# Patient Record
Sex: FEMALE | Race: WHITE | Employment: PART TIME | URBAN - METROPOLITAN AREA
[De-identification: names, ages, dates, MRNs, and addresses within clinical notes are randomized per-mention and may not be internally consistent; named-entity substitution may affect disease eponyms.]

---

## 2017-01-23 ENCOUNTER — OFFICE VISIT (OUTPATIENT)
Dept: INTERNAL MEDICINE CLINIC | Age: 32
End: 2017-01-23

## 2017-01-23 VITALS
BODY MASS INDEX: 22.26 KG/M2 | HEART RATE: 88 BPM | HEIGHT: 62 IN | DIASTOLIC BLOOD PRESSURE: 60 MMHG | TEMPERATURE: 97.9 F | WEIGHT: 121 LBS | SYSTOLIC BLOOD PRESSURE: 98 MMHG | RESPIRATION RATE: 16 BRPM

## 2017-01-23 DIAGNOSIS — J01.90 ACUTE SINUSITIS, RECURRENCE NOT SPECIFIED, UNSPECIFIED LOCATION: Primary | ICD-10-CM

## 2017-01-23 PROCEDURE — 99213 OFFICE O/P EST LOW 20 MIN: CPT | Performed by: INTERNAL MEDICINE

## 2017-01-23 RX ORDER — AMOXICILLIN 500 MG/1
500 CAPSULE ORAL 3 TIMES DAILY
Qty: 30 CAPSULE | Refills: 0 | Status: SHIPPED | OUTPATIENT
Start: 2017-01-23 | End: 2017-02-02

## 2017-01-23 ASSESSMENT — PATIENT HEALTH QUESTIONNAIRE - PHQ9
SUM OF ALL RESPONSES TO PHQ QUESTIONS 1-9: 0
2. FEELING DOWN, DEPRESSED OR HOPELESS: 0
SUM OF ALL RESPONSES TO PHQ9 QUESTIONS 1 & 2: 0
1. LITTLE INTEREST OR PLEASURE IN DOING THINGS: 0

## 2017-05-05 ENCOUNTER — OFFICE VISIT (OUTPATIENT)
Dept: INTERNAL MEDICINE CLINIC | Age: 32
End: 2017-05-05

## 2017-05-05 ENCOUNTER — TELEPHONE (OUTPATIENT)
Dept: INTERNAL MEDICINE CLINIC | Age: 32
End: 2017-05-05

## 2017-05-05 VITALS
TEMPERATURE: 99.2 F | WEIGHT: 127.4 LBS | HEART RATE: 98 BPM | RESPIRATION RATE: 16 BRPM | BODY MASS INDEX: 23.45 KG/M2 | SYSTOLIC BLOOD PRESSURE: 98 MMHG | DIASTOLIC BLOOD PRESSURE: 60 MMHG | HEIGHT: 62 IN

## 2017-05-05 DIAGNOSIS — J02.9 ACUTE PHARYNGITIS, UNSPECIFIED ETIOLOGY: Primary | ICD-10-CM

## 2017-05-05 PROCEDURE — 99213 OFFICE O/P EST LOW 20 MIN: CPT | Performed by: INTERNAL MEDICINE

## 2017-05-05 RX ORDER — PNV NO.95/FERROUS FUM/FOLIC AC 28MG-0.8MG
TABLET ORAL
COMMUNITY

## 2017-12-28 ENCOUNTER — OFFICE VISIT (OUTPATIENT)
Dept: SURGERY | Age: 32
End: 2017-12-28

## 2017-12-28 VITALS
HEIGHT: 62 IN | BODY MASS INDEX: 23 KG/M2 | SYSTOLIC BLOOD PRESSURE: 120 MMHG | WEIGHT: 125 LBS | DIASTOLIC BLOOD PRESSURE: 80 MMHG

## 2017-12-28 DIAGNOSIS — L72.3 SEBACEOUS CYST: Primary | ICD-10-CM

## 2017-12-28 PROCEDURE — 11421 EXC H-F-NK-SP B9+MARG 0.6-1: CPT | Performed by: SURGERY

## 2017-12-28 NOTE — LETTER
[facility]  [address] [place] [address] [facility] [address]  Phone: [phone]  Fax: [phone]    Mindy Allred MD        December 28, 2017     Angus Cheema 3    Patient: Bc Redding  MR Number: [id number]  YOB: 1985  Date of Visit: 12/28/2017    Dear Dr. Ramo Coon:    Vernell Velazquez was in today regarding a sebaceous cyst on her left shoulder. Went ahead with removal today. I will keep you posted of any new issues. If you have questions, please do not hesitate to call me. I look forward to following Priscilla along with you.     Sincerely,          Mindy Allred MD

## 2020-03-10 ENCOUNTER — OFFICE VISIT (OUTPATIENT)
Dept: SURGERY | Age: 35
End: 2020-03-10
Payer: COMMERCIAL

## 2020-03-10 VITALS
HEART RATE: 74 BPM | DIASTOLIC BLOOD PRESSURE: 74 MMHG | WEIGHT: 124 LBS | BODY MASS INDEX: 22.68 KG/M2 | SYSTOLIC BLOOD PRESSURE: 108 MMHG

## 2020-03-10 PROCEDURE — 99213 OFFICE O/P EST LOW 20 MIN: CPT | Performed by: SURGERY

## 2020-03-13 NOTE — PROGRESS NOTES
Established Patient Visit    [facility]   Nehemias Umana MD    [address]  Bisi Sexton  Phone: [phone]  Fax: [phone]    Priscilla Guaman   YOB: 1985    Date of Visit:  3/10/2020    No ref. provider found  Odalis Bell MD    Subjective:     Shweta Raymond presents for a follow-up of her right groin mass. Overall she has been doing OK since her last visit. She now feels that the mass is present all of the time and doesn't go back in. She is eating and drinking OK and bowels are working normally. She just found out that she was pregnant earlier in the week. She denies any fevers or chills. No Known Allergies  Outpatient Medications Marked as Taking for the 3/10/20 encounter (Office Visit) with Joshua Colorado MD   Medication Sig Dispense Refill    Prenatal Vit-Fe Fumarate-FA (PRENATAL VITAMIN) 27-0.8 MG TABS Take by mouth         Vitals:    03/10/20 1125   BP: 108/74   Pulse: 74   Weight: 124 lb (56.2 kg)     Body mass index is 22.68 kg/m². Wt Readings from Last 3 Encounters:   03/10/20 124 lb (56.2 kg)   12/28/17 125 lb (56.7 kg)   10/19/17 145 lb (65.8 kg)     BP Readings from Last 3 Encounters:   03/10/20 108/74   12/28/17 120/80   10/22/17 99/65          Objective:    CONSTITUTIONAL:  awake, alert, no apparent distress    LUNGS:  Resp easy and unlabored  ABDOMEN:  Lower transverse scar well healed; palpable nodule in the right groin without ability to reduce it, consistent with small incarcerated hernia, soft, non-distended, non-tender, voluntary guarding absent, no masses palpated and hernia absent  MUSCULOSKELETAL: No edema  NEUROLOGIC:  Mental Status Exam:  Level of Alertness:   awake  Orientation:   person, place, time  SKIN: as above          ASSESSMENT:     Diagnosis Orders   1.  Non-recurrent inguinal hernia of right side without obstruction or gangrene PLAN:    Adrien Elizabeth has a small right inguinal hernia. I was unable to reduce it on exam.  She denies any N/V/change in bowels that would be concerning for entrapped bowel. I suspect she has fat herniating into her inguinal canal.  As she is 6-7 weeks pregnancy I discussed avoiding surgical repair while she is pregnant unless absolutely necessary. She is going to follow up either late pregnancy or early post partum to further discuss surgical repair.       Electronically signed by Becky Hunt MD

## 2020-03-18 LAB
C. TRACHOMATIS, EXTERNAL RESULT: NEGATIVE
N. GONORRHOEAE, EXTERNAL RESULT: NEGATIVE

## 2020-04-08 LAB
HEP B, EXTERNAL RESULT: NEGATIVE
HEPATITIS C ANTIBODY, EXTERNAL RESULT: NEGATIVE
HIV, EXTERNAL RESULT: NEGATIVE
RPR, EXTERNAL RESULT: NORMAL
RUBELLA TITER, EXTERNAL RESULT: NORMAL

## 2020-09-30 LAB — GBS, EXTERNAL RESULT: POSITIVE

## 2020-10-16 ENCOUNTER — OFFICE VISIT (OUTPATIENT)
Dept: PRIMARY CARE CLINIC | Age: 35
End: 2020-10-16
Payer: COMMERCIAL

## 2020-10-16 LAB — SARS-COV-2, PCR: NOT DETECTED

## 2020-10-16 PROCEDURE — 99211 OFF/OP EST MAY X REQ PHY/QHP: CPT | Performed by: NURSE PRACTITIONER

## 2020-10-19 NOTE — RESULT ENCOUNTER NOTE

## 2020-10-22 ENCOUNTER — ANESTHESIA EVENT (OUTPATIENT)
Dept: LABOR AND DELIVERY | Age: 35
End: 2020-10-22
Payer: COMMERCIAL

## 2020-10-22 ENCOUNTER — HOSPITAL ENCOUNTER (INPATIENT)
Age: 35
LOS: 3 days | Discharge: HOME | End: 2020-10-25
Attending: OBSTETRICS & GYNECOLOGY | Admitting: OBSTETRICS & GYNECOLOGY
Payer: COMMERCIAL

## 2020-10-22 ENCOUNTER — ANESTHESIA (OUTPATIENT)
Dept: LABOR AND DELIVERY | Age: 35
End: 2020-10-22
Payer: COMMERCIAL

## 2020-10-22 VITALS — OXYGEN SATURATION: 100 % | DIASTOLIC BLOOD PRESSURE: 58 MMHG | SYSTOLIC BLOOD PRESSURE: 100 MMHG

## 2020-10-22 PROBLEM — Z98.891 HISTORY OF C-SECTION: Status: ACTIVE | Noted: 2020-10-22

## 2020-10-22 LAB
ABO/RH: NORMAL
AMPHETAMINE SCREEN, URINE: NORMAL
ANTIBODY SCREEN: NORMAL
BARBITURATE SCREEN URINE: NORMAL
BASOPHILS ABSOLUTE: 0 K/UL (ref 0–0.2)
BASOPHILS RELATIVE PERCENT: 0.1 %
BENZODIAZEPINE SCREEN, URINE: NORMAL
BUPRENORPHINE URINE: NORMAL
CANNABINOID SCREEN URINE: NORMAL
COCAINE METABOLITE SCREEN URINE: NORMAL
EOSINOPHILS ABSOLUTE: 0 K/UL (ref 0–0.6)
EOSINOPHILS RELATIVE PERCENT: 0 %
HCT VFR BLD CALC: 30.1 % (ref 36–48)
HCT VFR BLD CALC: 34.9 % (ref 36–48)
HEMOGLOBIN: 10.3 G/DL (ref 12–16)
HEMOGLOBIN: 11.7 G/DL (ref 12–16)
LYMPHOCYTES ABSOLUTE: 0.7 K/UL (ref 1–5.1)
LYMPHOCYTES RELATIVE PERCENT: 4.2 %
Lab: NORMAL
MCH RBC QN AUTO: 28.5 PG (ref 26–34)
MCH RBC QN AUTO: 29 PG (ref 26–34)
MCHC RBC AUTO-ENTMCNC: 33.7 G/DL (ref 31–36)
MCHC RBC AUTO-ENTMCNC: 34 G/DL (ref 31–36)
MCV RBC AUTO: 84.7 FL (ref 80–100)
MCV RBC AUTO: 85.3 FL (ref 80–100)
METHADONE SCREEN, URINE: NORMAL
MONOCYTES ABSOLUTE: 0.7 K/UL (ref 0–1.3)
MONOCYTES RELATIVE PERCENT: 4.2 %
NEUTROPHILS ABSOLUTE: 14.5 K/UL (ref 1.7–7.7)
NEUTROPHILS RELATIVE PERCENT: 91.5 %
OPIATE SCREEN URINE: NORMAL
OXYCODONE URINE: NORMAL
PDW BLD-RTO: 15 % (ref 12.4–15.4)
PDW BLD-RTO: 15.3 % (ref 12.4–15.4)
PH UA: 7
PHENCYCLIDINE SCREEN URINE: NORMAL
PLATELET # BLD: 151 K/UL (ref 135–450)
PLATELET # BLD: 182 K/UL (ref 135–450)
PMV BLD AUTO: 8 FL (ref 5–10.5)
PMV BLD AUTO: 8.1 FL (ref 5–10.5)
PROPOXYPHENE SCREEN: NORMAL
RBC # BLD: 3.53 M/UL (ref 4–5.2)
RBC # BLD: 4.12 M/UL (ref 4–5.2)
TOTAL SYPHILLIS IGG/IGM: NORMAL
WBC # BLD: 15.9 K/UL (ref 4–11)
WBC # BLD: 6.3 K/UL (ref 4–11)

## 2020-10-22 PROCEDURE — 2720000010 HC SURG SUPPLY STERILE: Performed by: OBSTETRICS & GYNECOLOGY

## 2020-10-22 PROCEDURE — 6360000002 HC RX W HCPCS

## 2020-10-22 PROCEDURE — 2580000003 HC RX 258: Performed by: OBSTETRICS & GYNECOLOGY

## 2020-10-22 PROCEDURE — 85027 COMPLETE CBC AUTOMATED: CPT

## 2020-10-22 PROCEDURE — 6370000000 HC RX 637 (ALT 250 FOR IP): Performed by: OBSTETRICS & GYNECOLOGY

## 2020-10-22 PROCEDURE — 3700000000 HC ANESTHESIA ATTENDED CARE: Performed by: OBSTETRICS & GYNECOLOGY

## 2020-10-22 PROCEDURE — 86900 BLOOD TYPING SEROLOGIC ABO: CPT

## 2020-10-22 PROCEDURE — 6360000002 HC RX W HCPCS: Performed by: OBSTETRICS & GYNECOLOGY

## 2020-10-22 PROCEDURE — 85025 COMPLETE CBC W/AUTO DIFF WBC: CPT

## 2020-10-22 PROCEDURE — 86850 RBC ANTIBODY SCREEN: CPT

## 2020-10-22 PROCEDURE — 3700000001 HC ADD 15 MINUTES (ANESTHESIA): Performed by: OBSTETRICS & GYNECOLOGY

## 2020-10-22 PROCEDURE — 6360000002 HC RX W HCPCS: Performed by: NURSE ANESTHETIST, CERTIFIED REGISTERED

## 2020-10-22 PROCEDURE — 86780 TREPONEMA PALLIDUM: CPT

## 2020-10-22 PROCEDURE — 80307 DRUG TEST PRSMV CHEM ANLYZR: CPT

## 2020-10-22 PROCEDURE — 36415 COLL VENOUS BLD VENIPUNCTURE: CPT

## 2020-10-22 PROCEDURE — 3609079900 HC CESAREAN SECTION: Performed by: OBSTETRICS & GYNECOLOGY

## 2020-10-22 PROCEDURE — 2500000003 HC RX 250 WO HCPCS: Performed by: NURSE ANESTHETIST, CERTIFIED REGISTERED

## 2020-10-22 PROCEDURE — 7100000000 HC PACU RECOVERY - FIRST 15 MIN: Performed by: OBSTETRICS & GYNECOLOGY

## 2020-10-22 PROCEDURE — 1220000000 HC SEMI PRIVATE OB R&B

## 2020-10-22 PROCEDURE — 2500000003 HC RX 250 WO HCPCS: Performed by: OBSTETRICS & GYNECOLOGY

## 2020-10-22 PROCEDURE — 2709999900 HC NON-CHARGEABLE SUPPLY: Performed by: OBSTETRICS & GYNECOLOGY

## 2020-10-22 PROCEDURE — 7100000001 HC PACU RECOVERY - ADDTL 15 MIN: Performed by: OBSTETRICS & GYNECOLOGY

## 2020-10-22 PROCEDURE — 86901 BLOOD TYPING SEROLOGIC RH(D): CPT

## 2020-10-22 RX ORDER — KETOROLAC TROMETHAMINE 30 MG/ML
30 INJECTION, SOLUTION INTRAMUSCULAR; INTRAVENOUS EVERY 6 HOURS
Status: COMPLETED | OUTPATIENT
Start: 2020-10-22 | End: 2020-10-23

## 2020-10-22 RX ORDER — SODIUM CHLORIDE, SODIUM LACTATE, POTASSIUM CHLORIDE, CALCIUM CHLORIDE 600; 310; 30; 20 MG/100ML; MG/100ML; MG/100ML; MG/100ML
INJECTION, SOLUTION INTRAVENOUS CONTINUOUS
Status: ACTIVE | OUTPATIENT
Start: 2020-10-22 | End: 2020-10-23

## 2020-10-22 RX ORDER — OXYTOCIN 10 [USP'U]/ML
INJECTION, SOLUTION INTRAMUSCULAR; INTRAVENOUS PRN
Status: DISCONTINUED | OUTPATIENT
Start: 2020-10-22 | End: 2020-10-22 | Stop reason: SDUPTHER

## 2020-10-22 RX ORDER — BUPIVACAINE HYDROCHLORIDE 7.5 MG/ML
INJECTION, SOLUTION INTRASPINAL PRN
Status: DISCONTINUED | OUTPATIENT
Start: 2020-10-22 | End: 2020-10-22 | Stop reason: SDUPTHER

## 2020-10-22 RX ORDER — KETOROLAC TROMETHAMINE 30 MG/ML
30 INJECTION, SOLUTION INTRAMUSCULAR; INTRAVENOUS EVERY 6 HOURS
Status: DISCONTINUED | OUTPATIENT
Start: 2020-10-22 | End: 2020-10-22 | Stop reason: SDUPTHER

## 2020-10-22 RX ORDER — SODIUM CHLORIDE, SODIUM LACTATE, POTASSIUM CHLORIDE, CALCIUM CHLORIDE 600; 310; 30; 20 MG/100ML; MG/100ML; MG/100ML; MG/100ML
INJECTION, SOLUTION INTRAVENOUS CONTINUOUS
Status: DISCONTINUED | OUTPATIENT
Start: 2020-10-22 | End: 2020-10-22

## 2020-10-22 RX ORDER — ONDANSETRON 2 MG/ML
4 INJECTION INTRAMUSCULAR; INTRAVENOUS EVERY 6 HOURS PRN
Status: DISCONTINUED | OUTPATIENT
Start: 2020-10-22 | End: 2020-10-22

## 2020-10-22 RX ORDER — ONDANSETRON 2 MG/ML
4 INJECTION INTRAMUSCULAR; INTRAVENOUS EVERY 6 HOURS PRN
Status: DISCONTINUED | OUTPATIENT
Start: 2020-10-22 | End: 2020-10-25 | Stop reason: HOSPADM

## 2020-10-22 RX ORDER — OXYCODONE HYDROCHLORIDE AND ACETAMINOPHEN 5; 325 MG/1; MG/1
2 TABLET ORAL EVERY 4 HOURS PRN
Status: DISCONTINUED | OUTPATIENT
Start: 2020-10-22 | End: 2020-10-25 | Stop reason: HOSPADM

## 2020-10-22 RX ORDER — SODIUM CHLORIDE 0.9 % (FLUSH) 0.9 %
10 SYRINGE (ML) INJECTION EVERY 12 HOURS SCHEDULED
Status: DISCONTINUED | OUTPATIENT
Start: 2020-10-22 | End: 2020-10-25 | Stop reason: HOSPADM

## 2020-10-22 RX ORDER — EPHEDRINE SULFATE/0.9% NACL/PF 50 MG/5 ML
SYRINGE (ML) INTRAVENOUS PRN
Status: DISCONTINUED | OUTPATIENT
Start: 2020-10-22 | End: 2020-10-22 | Stop reason: SDUPTHER

## 2020-10-22 RX ORDER — DEXAMETHASONE SODIUM PHOSPHATE 4 MG/ML
INJECTION, SOLUTION INTRA-ARTICULAR; INTRALESIONAL; INTRAMUSCULAR; INTRAVENOUS; SOFT TISSUE PRN
Status: DISCONTINUED | OUTPATIENT
Start: 2020-10-22 | End: 2020-10-22 | Stop reason: SDUPTHER

## 2020-10-22 RX ORDER — DOCUSATE SODIUM 100 MG/1
100 CAPSULE, LIQUID FILLED ORAL 2 TIMES DAILY
Status: DISCONTINUED | OUTPATIENT
Start: 2020-10-22 | End: 2020-10-25 | Stop reason: HOSPADM

## 2020-10-22 RX ORDER — SODIUM CHLORIDE, SODIUM LACTATE, POTASSIUM CHLORIDE, AND CALCIUM CHLORIDE .6; .31; .03; .02 G/100ML; G/100ML; G/100ML; G/100ML
1000 INJECTION, SOLUTION INTRAVENOUS ONCE
Status: COMPLETED | OUTPATIENT
Start: 2020-10-22 | End: 2020-10-22

## 2020-10-22 RX ORDER — ONDANSETRON 2 MG/ML
INJECTION INTRAMUSCULAR; INTRAVENOUS PRN
Status: DISCONTINUED | OUTPATIENT
Start: 2020-10-22 | End: 2020-10-22 | Stop reason: SDUPTHER

## 2020-10-22 RX ORDER — METOCLOPRAMIDE HYDROCHLORIDE 5 MG/ML
10 INJECTION INTRAMUSCULAR; INTRAVENOUS ONCE
Status: COMPLETED | OUTPATIENT
Start: 2020-10-22 | End: 2020-10-22

## 2020-10-22 RX ORDER — IBUPROFEN 400 MG/1
800 TABLET ORAL EVERY 8 HOURS
Status: DISCONTINUED | OUTPATIENT
Start: 2020-10-23 | End: 2020-10-23

## 2020-10-22 RX ORDER — NALOXONE HYDROCHLORIDE 0.4 MG/ML
0.4 INJECTION, SOLUTION INTRAMUSCULAR; INTRAVENOUS; SUBCUTANEOUS PRN
Status: DISCONTINUED | OUTPATIENT
Start: 2020-10-22 | End: 2020-10-25 | Stop reason: HOSPADM

## 2020-10-22 RX ORDER — SODIUM CHLORIDE 0.9 % (FLUSH) 0.9 %
10 SYRINGE (ML) INJECTION PRN
Status: DISCONTINUED | OUTPATIENT
Start: 2020-10-22 | End: 2020-10-22

## 2020-10-22 RX ORDER — SODIUM CHLORIDE 0.9 % (FLUSH) 0.9 %
10 SYRINGE (ML) INJECTION PRN
Status: DISCONTINUED | OUTPATIENT
Start: 2020-10-22 | End: 2020-10-25 | Stop reason: HOSPADM

## 2020-10-22 RX ORDER — LANOLIN 100 %
OINTMENT (GRAM) TOPICAL
Status: DISCONTINUED | OUTPATIENT
Start: 2020-10-22 | End: 2020-10-25 | Stop reason: HOSPADM

## 2020-10-22 RX ORDER — METHYLERGONOVINE MALEATE 0.2 MG/ML
INJECTION INTRAVENOUS
Status: DISCONTINUED
Start: 2020-10-22 | End: 2020-10-22

## 2020-10-22 RX ORDER — FENTANYL CITRATE 50 UG/ML
INJECTION, SOLUTION INTRAMUSCULAR; INTRAVENOUS PRN
Status: DISCONTINUED | OUTPATIENT
Start: 2020-10-22 | End: 2020-10-22 | Stop reason: SDUPTHER

## 2020-10-22 RX ORDER — ACETAMINOPHEN 325 MG/1
650 TABLET ORAL EVERY 4 HOURS PRN
Status: DISCONTINUED | OUTPATIENT
Start: 2020-10-22 | End: 2020-10-25 | Stop reason: HOSPADM

## 2020-10-22 RX ORDER — DIPHENHYDRAMINE HYDROCHLORIDE 50 MG/ML
25 INJECTION INTRAMUSCULAR; INTRAVENOUS EVERY 6 HOURS PRN
Status: DISCONTINUED | OUTPATIENT
Start: 2020-10-22 | End: 2020-10-25 | Stop reason: HOSPADM

## 2020-10-22 RX ORDER — OXYCODONE HYDROCHLORIDE AND ACETAMINOPHEN 5; 325 MG/1; MG/1
1 TABLET ORAL EVERY 4 HOURS PRN
Status: DISCONTINUED | OUTPATIENT
Start: 2020-10-22 | End: 2020-10-25 | Stop reason: HOSPADM

## 2020-10-22 RX ORDER — KETOROLAC TROMETHAMINE 30 MG/ML
INJECTION, SOLUTION INTRAMUSCULAR; INTRAVENOUS PRN
Status: DISCONTINUED | OUTPATIENT
Start: 2020-10-22 | End: 2020-10-22 | Stop reason: SDUPTHER

## 2020-10-22 RX ORDER — NALBUPHINE HCL 10 MG/ML
5 AMPUL (ML) INJECTION EVERY 4 HOURS PRN
Status: DISCONTINUED | OUTPATIENT
Start: 2020-10-22 | End: 2020-10-22 | Stop reason: RX

## 2020-10-22 RX ORDER — MORPHINE SULFATE 0.5 MG/ML
INJECTION, SOLUTION EPIDURAL; INTRATHECAL; INTRAVENOUS PRN
Status: DISCONTINUED | OUTPATIENT
Start: 2020-10-22 | End: 2020-10-22 | Stop reason: SDUPTHER

## 2020-10-22 RX ORDER — KETOROLAC TROMETHAMINE 30 MG/ML
30 INJECTION, SOLUTION INTRAMUSCULAR; INTRAVENOUS EVERY 6 HOURS
Status: DISCONTINUED | OUTPATIENT
Start: 2020-10-22 | End: 2020-10-22

## 2020-10-22 RX ORDER — SODIUM CHLORIDE 0.9 % (FLUSH) 0.9 %
10 SYRINGE (ML) INJECTION EVERY 12 HOURS SCHEDULED
Status: DISCONTINUED | OUTPATIENT
Start: 2020-10-22 | End: 2020-10-22

## 2020-10-22 RX ORDER — METHYLERGONOVINE MALEATE 0.2 MG/ML
200 INJECTION INTRAVENOUS ONCE
Status: COMPLETED | OUTPATIENT
Start: 2020-10-22 | End: 2020-10-22

## 2020-10-22 RX ORDER — METHYLERGONOVINE MALEATE 0.2 MG/1
200 TABLET ORAL EVERY 6 HOURS SCHEDULED
Status: COMPLETED | OUTPATIENT
Start: 2020-10-22 | End: 2020-10-23

## 2020-10-22 RX ADMIN — Medication 10 MG: at 09:25

## 2020-10-22 RX ADMIN — DOCUSATE SODIUM 100 MG: 100 CAPSULE ORAL at 22:04

## 2020-10-22 RX ADMIN — DEXAMETHASONE SODIUM PHOSPHATE 8 MG: 4 INJECTION, SOLUTION INTRAMUSCULAR; INTRAVENOUS at 09:45

## 2020-10-22 RX ADMIN — METHYLERGONOVINE MALEATE 200 MCG: 0.2 INJECTION INTRAVENOUS at 11:37

## 2020-10-22 RX ADMIN — METHYLERGONOVINE 200 MCG: 0.2 TABLET ORAL at 17:08

## 2020-10-22 RX ADMIN — SODIUM CHLORIDE, POTASSIUM CHLORIDE, SODIUM LACTATE AND CALCIUM CHLORIDE 30 ML/HR: 600; 310; 30; 20 INJECTION, SOLUTION INTRAVENOUS at 10:48

## 2020-10-22 RX ADMIN — KETOROLAC TROMETHAMINE 30 MG: 30 INJECTION, SOLUTION INTRAMUSCULAR at 10:00

## 2020-10-22 RX ADMIN — CEFAZOLIN SODIUM 1 G: 1 INJECTION, POWDER, FOR SOLUTION INTRAMUSCULAR; INTRAVENOUS at 17:08

## 2020-10-22 RX ADMIN — MORPHINE SULFATE 0.2 MG: 0.5 INJECTION, SOLUTION EPIDURAL; INTRATHECAL; INTRAVENOUS at 09:10

## 2020-10-22 RX ADMIN — SODIUM CHLORIDE, POTASSIUM CHLORIDE, SODIUM LACTATE AND CALCIUM CHLORIDE 1000 ML: 600; 310; 30; 20 INJECTION, SOLUTION INTRAVENOUS at 07:45

## 2020-10-22 RX ADMIN — FENTANYL CITRATE 10 MCG: 50 INJECTION INTRAMUSCULAR; INTRAVENOUS at 09:10

## 2020-10-22 RX ADMIN — SODIUM CHLORIDE, POTASSIUM CHLORIDE, SODIUM LACTATE AND CALCIUM CHLORIDE 125 ML/HR: 600; 310; 30; 20 INJECTION, SOLUTION INTRAVENOUS at 08:53

## 2020-10-22 RX ADMIN — ONDANSETRON 4 MG: 2 INJECTION INTRAMUSCULAR; INTRAVENOUS at 09:45

## 2020-10-22 RX ADMIN — Medication 10 MG: at 09:20

## 2020-10-22 RX ADMIN — SODIUM CHLORIDE, POTASSIUM CHLORIDE, SODIUM LACTATE AND CALCIUM CHLORIDE: 600; 310; 30; 20 INJECTION, SOLUTION INTRAVENOUS at 08:59

## 2020-10-22 RX ADMIN — SODIUM CHLORIDE, POTASSIUM CHLORIDE, SODIUM LACTATE AND CALCIUM CHLORIDE: 600; 310; 30; 20 INJECTION, SOLUTION INTRAVENOUS at 09:43

## 2020-10-22 RX ADMIN — METOCLOPRAMIDE HYDROCHLORIDE 10 MG: 5 INJECTION INTRAMUSCULAR; INTRAVENOUS at 07:48

## 2020-10-22 RX ADMIN — METHYLERGONOVINE 200 MCG: 0.2 TABLET ORAL at 23:11

## 2020-10-22 RX ADMIN — KETOROLAC TROMETHAMINE 30 MG: 30 INJECTION, SOLUTION INTRAMUSCULAR at 22:04

## 2020-10-22 RX ADMIN — FAMOTIDINE 20 MG: 10 INJECTION, SOLUTION INTRAVENOUS at 07:55

## 2020-10-22 RX ADMIN — Medication 10 MG: at 09:17

## 2020-10-22 RX ADMIN — BUPIVACAINE HYDROCHLORIDE IN DEXTROSE 1.5 ML: 7.5 INJECTION, SOLUTION SUBARACHNOID at 09:10

## 2020-10-22 RX ADMIN — Medication 10 MG: at 09:18

## 2020-10-22 RX ADMIN — OXYTOCIN 10 UNITS: 10 INJECTION INTRAVENOUS at 09:44

## 2020-10-22 RX ADMIN — METHYLERGONOVINE MALEATE 200 MCG: 0.2 INJECTION, SOLUTION INTRAMUSCULAR; INTRAVENOUS at 11:37

## 2020-10-22 RX ADMIN — KETOROLAC TROMETHAMINE 30 MG: 30 INJECTION, SOLUTION INTRAMUSCULAR at 16:30

## 2020-10-22 RX ADMIN — Medication 95 MILLI-UNITS/MIN: at 10:48

## 2020-10-22 RX ADMIN — CEFAZOLIN SODIUM 2 G: 10 INJECTION, POWDER, FOR SOLUTION INTRAVENOUS at 09:13

## 2020-10-22 RX ADMIN — OXYTOCIN 20 UNITS: 10 INJECTION INTRAVENOUS at 09:42

## 2020-10-22 ASSESSMENT — PULMONARY FUNCTION TESTS
PIF_VALUE: 0
PIF_VALUE: 1
PIF_VALUE: 0

## 2020-10-22 ASSESSMENT — PAIN SCALES - GENERAL
PAINLEVEL_OUTOF10: 0
PAINLEVEL_OUTOF10: 0

## 2020-10-22 NOTE — FLOWSHEET NOTE
Pt ambulated from room 2257 to PACU for her scheduled . Monitors placed, vitals done, admission assessment and IV/labs started.

## 2020-10-22 NOTE — ANESTHESIA PRE PROCEDURE
Department of Anesthesiology  Preprocedure Note       Name:  Dar Gomez   Age:  38 y.o.  :  1985                                          MRN:  [id number]         Date:  10/22/2020      Surgeon: Gerber Pennington):  Virginia Kirkland MD    Procedure: Procedure(s):   SECTION    Medications prior to admission:   Prior to Admission medications    Medication Sig Start Date End Date Taking?  Authorizing Provider   Prenatal Vit-Fe Fumarate-FA (PRENATAL VITAMIN) 27-0.8 MG TABS Take by mouth   Yes Historical Provider, MD       Current medications:    Current Facility-Administered Medications   Medication Dose Route Frequency Provider Last Rate Last Dose    lactated ringers infusion   Intravenous Continuous Virginia Kirkland MD        lactated ringers bolus  1,000 mL Intravenous Once Virginia Kirkland MD        sodium chloride flush 0.9 % injection 10 mL  10 mL Intravenous 2 times per day Virginia Kirkland MD        sodium chloride flush 0.9 % injection 10 mL  10 mL Intravenous PRN Virginia Kirkland MD        famotidine (PEPCID) injection 20 mg  20 mg Intravenous Once Virginia Kirkland MD        ondansetron [facility]) injection 4 mg  4 mg Intravenous Q6H PRN Virginia Kirkland MD        metoclopramide (REGLAN) injection 10 mg  10 mg Intravenous Once Virginia Kirkland MD        ceFAZolin (ANCEF) 2 g in dextrose 5 % 100 mL IVPB  2 g Intravenous Once Virginia Kirkland MD        oxytocin (PITOCIN) 30 units in 500 mL infusion   Intravenous PRN Virginia Kirkland MD        And    oxytocin (PITOCIN) 30 units in 500 mL infusion  95 brianne-units/min Intravenous PRN Virginia Kirkland MD           Allergies:  No Known Allergies    Problem List:    Patient Active Problem List   Diagnosis Code    Radial styloid tenosynovitis M65.4       Past Medical History:        Diagnosis Date    Postpartum depression        Past Surgical History:        Procedure Laterality Date     SECTION      [address] EXTRACTION   Social History:    Social History     Tobacco Use    Smoking status: Never Smoker    Smokeless tobacco: Never Used   Substance Use Topics    Alcohol use: No                                Counseling given: Not Answered      Vital Signs (Current):   Vitals:    10/22/20 0739   Weight: 139 lb (63 kg)   Height: 5' 2\" (1.575 m)                                              BP Readings from Last 3 Encounters:   03/10/20 108/74   12/28/17 120/80   10/22/17 99/65       NPO Status: Time of last liquid consumption: 2100                        Time of last solid consumption: 1900                        Date of last liquid consumption: 10/21/20                        Date of last solid food consumption: 10/21/20    BMI:   Wt Readings from Last 3 Encounters:   10/22/20 139 lb (63 kg)   03/10/20 124 lb (56.2 kg)   12/28/17 125 lb (56.7 kg)     Body mass index is 25.42 kg/m². CBC:   Lab Results   Component Value Date    WBC 9.7 10/20/2017    RBC 3.84 10/20/2017    HGB 10.5 10/20/2017    HCT 31.7 10/20/2017    MCV 82.5 10/20/2017    RDW 15.8 10/20/2017     10/20/2017       CMP: No results found for: NA, K, CL, CO2, BUN, CREATININE, GFRAA, AGRATIO, LABGLOM, GLUCOSE, PROT, CALCIUM, BILITOT, ALKPHOS, AST, ALT    POC Tests: No results for input(s): POCGLU, POCNA, POCK, POCCL, POCBUN, POCHEMO, POCHCT in the last 72 hours.     Coags: No results found for: PROTIME, INR, APTT    HCG (If Applicable): No results found for: PREGTESTUR, PREGSERUM, HCG, HCGQUANT     ABGs: No results found for: PHART, PO2ART, CWG4KJA, OVS8DEW, BEART, H4NAAMRO     Type & Screen (If Applicable):  No results found for: LABABO, LABRH    Drug/Infectious Status (If Applicable):  No results found for: HIV, HEPCAB    COVID-19 Screening (If Applicable):   Lab Results   Component Value Date    COVID19 Not Detected 10/16/2020         Anesthesia Evaluation  Patient summary reviewed no history of anesthetic complications:   Airway: Mallampati: I  TM distance: >3 FB   Neck ROM: full  Mouth opening: > = 3 FB Dental: normal exam         Pulmonary:Negative Pulmonary ROS and normal exam                               Cardiovascular:Negative CV ROS  Exercise tolerance: good (>4 METS),           Rhythm: regular  Rate: normal           Beta Blocker:  Not on Beta Blocker         Neuro/Psych:   Negative Neuro/Psych ROS              GI/Hepatic/Renal: Neg GI/Hepatic/Renal ROS            Endo/Other: Negative Endo/Other ROS                    Abdominal:           Vascular: negative vascular ROS. Anesthesia Plan      spinal     ASA 1           MIPS: Postoperative opioids intended and Prophylactic antiemetics administered. Anesthetic plan and risks discussed with patient. Use of blood products discussed with patient whom consented to blood products.                    [facility], APRN - CRNA   10/22/2020

## 2020-10-22 NOTE — ANESTHESIA POSTPROCEDURE EVALUATION
Department of Anesthesiology  Postprocedure Note    Patient: Marcelino Bell  MRN: [id number]  YOB: 1985  Date of evaluation: 10/22/2020  Time:  1:49 PM     Procedure Summary     Date:  10/22/20 Room / [facility]:  [facility] [address]    Anesthesia Start:  2240 Anesthesia Stop:  0872    Procedure:  repeat  SECTION low trasverse uterine incision at 0940 (N/A ) Diagnosis:  (Repeat , , [facility] 10/23/20)    Surgeon: Yuridia Apodaca MD Responsible Provider:  Crystal Bay MD    Anesthesia Type:  spinal ASA Status:  1          Anesthesia Type: spinal    Kentrell Phase I: Kentrell Score: 9    Kentrell Phase II: Kentrell Score: 10    Last vitals: Reviewed and per EMR flowsheets.        Anesthesia Post Evaluation    Patient location during evaluation: PACU  Patient participation: complete - patient participated  Level of consciousness: awake and alert  Pain score: 0  Airway patency: patent  Nausea & Vomiting: no vomiting and no nausea  Complications: no  Cardiovascular status: blood pressure returned to baseline and hemodynamically stable  Respiratory status: acceptable and room air  Hydration status: stable

## 2020-10-22 NOTE — L&D DELIVERY NOTE
Department of Obstetrics and Gynecology   Section Note    Indications: patient declines vag del attempt    Pre-operative Diagnosis:   IUP @ 39 6/7 wks  Previous  x 2    Post-operative Diagnosis: same    Surgeon: Sarbjit Bloom     Anesthesia: Spinal anesthesia    Procedure:   Repeat low transverse  section    Procedure Details   The patient was seen and the risks, benefits, complications, treatment options, and expected outcomes were discussed with the patient. The patient concurred with the proposed plan, giving informed consent. The patient was taken to the OR where spinal anesthesia was placed without difficulty. The patient was prepped and draped in the normal sterile fashion. A time-out was performed where patient identity as well as procedure to be performed were confirmed with the entire OR staff. A Pfannenstiel incision was made with scalpel. The incision was carried down through the subcutaneous tissue to the fascia where fascia was found to be adhered to underlying rectus muscles. Fascial incision was made and extended transversely. The fascia was  from the underlying rectus tissue superiorly and inferiorly. The peritoneum was identified and entered. Peritoneal incision was extended longitudinally with excellent visualization of the bladder. An Jovanny retractor was placed into abdomen with careful attention paid to avoid underlying bowel and organs. The utero-vesical peritoneal reflection was incised transversely and the bladder flap was bluntly freed from the lower uterine segment. A low transverse uterine incision was made. Delivered from LOT presentation was a 3410 gram viable male infant with Apgar scores of 6 at one minute and 9 at five minutes. After the umbilical cord was clamped and cut cord blood was obtained for evaluation.  The placenta was removed intact and appeared normal. The uterine outline, tubes and ovaries appeared normal. The uterine incision was closed with running locked sutures of 0 Monocryl. Hemostasis was observed. The gutters were irrigated and cleared of all clots and debris. The peritoneum and fascia were then reapproximated with running sutures of 0 Vicryl, the underlying layer of adipose reapproximated with 3-0 Vicryl and the skin closed with 4-0 Vicryl. Surgicel powder was placed over the rectus muscles to prevent hematoma formation from small bleeding from scar tissue. Instrument, sponge, and needle counts were correct prior the abdominal closure and at the conclusion of the case. Findings:  Viable male infant, wgt 3410gms, APGARS 6/9    Estimated Blood Loss:  QBL 894mls           Drains: mccormick catheter to gravity           Total IV Fluids: 1700 ml    UOP: 500 mls clear urine at end of procedure      Specimens: none           Implants: none           Complications:  none           Disposition: PACU - hemodynamically stable.            Condition: infant stable and mother stable

## 2020-10-22 NOTE — FLOWSHEET NOTE
DrGibler called at 1130 after large bleeding and large clots noted after fundal check, fundus firm at U with clots. DrGibler at bedside for manual vaginal exam. Order for Methergine received and done. Pt fundus firm at U-1 with small bleeding after exam and clearance done by DrGibler, will cont to monitor. Order received for Ancef 1gram once 8hours after last dose and Methergine 0.2 every 6 hours for 24hours with a total of 4 doses.

## 2020-10-22 NOTE — ANESTHESIA PROCEDURE NOTES
Spinal Block    Patient location during procedure: OR  Start time: 10/22/2020 9:00 AM  End time: 10/22/2020 9:20 AM  Reason for block: primary anesthetic  Staffing  Anesthesiologist: Maxine Vaughn MD  Resident/CRNA: DHARMESH Daley - CRNA  Performed: resident/CRNA   Preanesthetic Checklist  Completed: patient identified, site marked, surgical consent, pre-op evaluation, timeout performed, IV checked, risks and benefits discussed, monitors and equipment checked, anesthesia consent given, oxygen available and patient being monitored  Spinal Block  Patient position: sitting  Prep: ChloraPrep and site prepped and draped  Patient monitoring: frequent blood pressure checks and continuous pulse ox  Approach: midline  Location: L3/L4  Provider prep: mask and sterile gloves  Local infiltration: lidocaine  Agent: bupivacaine  Adjuvant medications: fentanyl 10mcg/duramorph 0.2mg. Dose: 1.5  Dose: 1.5  Needle  Needle type:  Jose Luis   Needle gauge: 27 G  Needle length: 3.5 in  Assessment  Sensory level: T4  Swirl obtained: Yes  CSF: clear  Attempts: 1  Hemodynamics: stable

## 2020-10-22 NOTE — H&P
Department of Obstetrics and Gynecology   Obstetrics History and Physical        CHIEF COMPLAINT:  Repeat      HISTORY OF PRESENT ILLNESS:    Julisa Mendez  is a 38 y.o.  female at Unknown presents with a chief complaint as above and is being admitted for repeat  without BTL. Estimated Due Date: Estimated Date of Delivery: None noted. PRENATAL CARE: Complicated by: none    PAST OB HISTORY:  OB History        3    Para   3    Term   3       0    AB   0    Living   2       SAB   0    TAB   0    Ectopic   0    Molar        Multiple   0    Live Births   2              Past Medical History:        Diagnosis Date    Postpartum depression      Past Surgical History:        Procedure Laterality Date     SECTION       Allergies:  Patient has no known allergies.   Social History:    Social History     Socioeconomic History    Marital status:      Spouse name: Not on file    Number of children: Not on file    Years of education: Not on file    Highest education level: Not on file   Occupational History    Not on file   Social Needs    Financial resource strain: Not on file    Food insecurity     Worry: Not on file     Inability: Not on file    Transportation needs     Medical: Not on file     Non-medical: Not on file   Tobacco Use    Smoking status: Never Smoker    Smokeless tobacco: Never Used   Substance and Sexual Activity    Alcohol use: No    Drug use: No    Sexual activity: Yes     Partners: Male   Lifestyle    Physical activity     Days per week: Not on file     Minutes per session: Not on file    Stress: Not on file   Relationships    Social connections     Talks on phone: Not on file     Gets together: Not on file     Attends Orthodoxy service: Not on file     Active member of club or organization: Not on file     Attends meetings of clubs or organizations: Not on file     Relationship status: Not on file    Intimate partner violence     Fear of current or ex partner: Not on file     Emotionally abused: Not on file     Physically abused: Not on file     Forced sexual activity: Not on file   Other Topics Concern    Not on file   Social History Narrative    Not on file     Family History:       Problem Relation Age of Onset    High Cholesterol Father     Diabetes Paternal Grandmother     Hypertension Paternal Grandmother     Cancer Paternal Grandfather         pancretic cancer     Medications Prior to Admission:  Medications Prior to Admission: Prenatal Vit-Fe Fumarate-FA (PRENATAL VITAMIN) 27-0.8 MG TABS, Take by mouth    REVIEW OF SYSTEMS:  negative     PHYSICAL EXAM:    There were no vitals filed for this visit. General appearance:  awake, alert, cooperative, no apparent distress, and appears stated age  Neurologic:  Awake, alert, oriented to name, place and time.     Lungs:  No increased work of breathing, good air exchange  Abdomen:  Soft, non tender, gravid, fundal height consistent with the gestational age, EFW by Leopold's maneuvers is AGA  Pelvis:  Adequate pelvis  Cervix: n/a  Contraction frequency: irregular  Membranes:  Intact  Labs:   Lab Results   Component Value Date    WBC 6.3 10/22/2020    HGB 11.7 (L) 10/22/2020    HCT 34.9 (L) 10/22/2020    MCV 84.7 10/22/2020     10/22/2020       ASSESSMENT:  <principal problem not specified>    PLAN: , Admit  Fetus: Reassuring      Electronically signed by Qi Azul MD on 10/22/2020 at 7:13 AM

## 2020-10-22 NOTE — FLOWSHEET NOTE
Pt A&OX4, respirations even and unlabored. VSS. Fine is patent and draining clear yellow urine. Bleeding is small and fundus is firm, midline, U/-1. Patient diet is now advanced as tolerated per Dr Titus Sanon. Alcira care given and pad changed. Pt denies any other needs at this time. Will continue to monitor.

## 2020-10-22 NOTE — FLOWSHEET NOTE
Pt done with recovery. Alcira care done, bleeding small and fundus firm at U. Pt taken to  room 2257 via stretcher, spouse pushing infant in bassinet. Oriented to room, white board updated. Pt sitting up in bed, apple juice given. Call light within reach,pt denies needing anything at this time.

## 2020-10-22 NOTE — FLOWSHEET NOTE
Pt sitting up in bed, infant sleeping in bassinet at the bedside. Pt denies feeling nauseous, crackers and juice given. Call light within reach, pt denies needing anything further.

## 2020-10-22 NOTE — LACTATION NOTE
This note was copied from a baby's chart. Lactation Progress Note  Initial Consult    Data: Referral received per RN. Action: LC to room. Mother resting in bed. Infant sleeping, swaddled in bassinet, showing no hunger cues at this time. Mother states agreeable to [facility] from [facility] at this time. I reviewed Care Plan for First 24 Hours of Life already in patient binder. Discussed recognizing hunger cues and offering the breast when cues are shown. Encouraged breastfeeding on demand and attempting/offering at least every 3 hours. Informed infant may have one 5 hour stretch of sleep in a 24 hour period. Encouraged unlimited skin to skin contact with infant and reviewed benefits including better temperature, heart rate, respiration, blood pressure, and blood sugar regulation. Also increased bonding and milk supply associated with skin to skin contact. Discussed feeding positions, latch on techniques, signs of milk transfer, output goals and normal feeding/sleeping behaviors. I referred mother to binder for additional information about breastfeeding and skin to skin contact. Discussed hand expression with mother. Encouraged mother to practice getting drops to infant today. Reinforced importance of positioning infant nose to nipple, belly to belly, waiting for wide open mouth, and bringing baby onto breast to ensure a deep latch. Discussed importance of obtaining deep latch to ensure proper milk transfer, milk production and supply and maternal comfort. Mother has breastfeeding hx of about 1 year with each of her previous children. 2 out of 3 of her children were tongue tied, they had frenotomy's in the hospital. Mother thinks that this child may be tongue tied as well. Mother states his first feeding felt pinchy. Gave tips on positioning to help achieve a deep latch to support maternal comfort. I gave resources for [address] and [facility].     Mother already has a new breast pump for

## 2020-10-22 NOTE — PROGRESS NOTES
Called by RN because of large clot and bleeding post  in PACU. Pt noted to have large clot at perineum. Sterile bimanual exam revealed enlarged uterus with clot extending from REED to fundus. No retained placenta noted. Uterine incision intact. Cervix dilated to 3cm. Clot was slowly expressed manually until uterus contracted from at umbilicus to 1 below. Vitals wnl. Postpartum hemorrhage - Clot evacuated, QBL 1800mls. - Started on methergine x 24 hours   - will check CBC at 6pm and 6am tomorrow   - Ancef 1gm x 1 dose in 8 hours for manual removal of clot. Discussed with pt and .

## 2020-10-23 LAB
BASOPHILS ABSOLUTE: 0 K/UL (ref 0–0.2)
BASOPHILS RELATIVE PERCENT: 0.2 %
EOSINOPHILS ABSOLUTE: 0.1 K/UL (ref 0–0.6)
EOSINOPHILS RELATIVE PERCENT: 0.5 %
HCT VFR BLD CALC: 25.5 % (ref 36–48)
HEMOGLOBIN: 8.9 G/DL (ref 12–16)
LYMPHOCYTES ABSOLUTE: 1 K/UL (ref 1–5.1)
LYMPHOCYTES RELATIVE PERCENT: 11.1 %
MCH RBC QN AUTO: 29.3 PG (ref 26–34)
MCHC RBC AUTO-ENTMCNC: 34.7 G/DL (ref 31–36)
MCV RBC AUTO: 84.6 FL (ref 80–100)
MONOCYTES ABSOLUTE: 0.7 K/UL (ref 0–1.3)
MONOCYTES RELATIVE PERCENT: 8.1 %
NEUTROPHILS ABSOLUTE: 7.4 K/UL (ref 1.7–7.7)
NEUTROPHILS RELATIVE PERCENT: 80.1 %
PDW BLD-RTO: 15.2 % (ref 12.4–15.4)
PLATELET # BLD: 149 K/UL (ref 135–450)
PMV BLD AUTO: 8.1 FL (ref 5–10.5)
RBC # BLD: 3.02 M/UL (ref 4–5.2)
WBC # BLD: 9.2 K/UL (ref 4–11)

## 2020-10-23 PROCEDURE — 6370000000 HC RX 637 (ALT 250 FOR IP): Performed by: OBSTETRICS & GYNECOLOGY

## 2020-10-23 PROCEDURE — 85025 COMPLETE CBC W/AUTO DIFF WBC: CPT

## 2020-10-23 PROCEDURE — 1220000000 HC SEMI PRIVATE OB R&B

## 2020-10-23 PROCEDURE — 6360000002 HC RX W HCPCS: Performed by: NURSE ANESTHETIST, CERTIFIED REGISTERED

## 2020-10-23 PROCEDURE — 2580000003 HC RX 258: Performed by: OBSTETRICS & GYNECOLOGY

## 2020-10-23 PROCEDURE — 36415 COLL VENOUS BLD VENIPUNCTURE: CPT

## 2020-10-23 PROCEDURE — 6360000002 HC RX W HCPCS: Performed by: OBSTETRICS & GYNECOLOGY

## 2020-10-23 RX ORDER — IBUPROFEN 400 MG/1
800 TABLET ORAL EVERY 8 HOURS PRN
Status: DISCONTINUED | OUTPATIENT
Start: 2020-10-23 | End: 2020-10-25 | Stop reason: HOSPADM

## 2020-10-23 RX ORDER — FERROUS SULFATE TAB EC 324 MG (65 MG FE EQUIVALENT) 324 (65 FE) MG
324 TABLET DELAYED RESPONSE ORAL 2 TIMES DAILY WITH MEALS
Status: DISCONTINUED | OUTPATIENT
Start: 2020-10-23 | End: 2020-10-25 | Stop reason: HOSPADM

## 2020-10-23 RX ADMIN — DOCUSATE SODIUM 100 MG: 100 CAPSULE ORAL at 21:57

## 2020-10-23 RX ADMIN — OXYCODONE HYDROCHLORIDE AND ACETAMINOPHEN 1 TABLET: 5; 325 TABLET ORAL at 19:13

## 2020-10-23 RX ADMIN — DOCUSATE SODIUM 100 MG: 100 CAPSULE ORAL at 09:23

## 2020-10-23 RX ADMIN — IBUPROFEN 800 MG: 400 TABLET, FILM COATED ORAL at 14:04

## 2020-10-23 RX ADMIN — METHYLERGONOVINE 200 MCG: 0.2 TABLET ORAL at 06:00

## 2020-10-23 RX ADMIN — OXYCODONE HYDROCHLORIDE AND ACETAMINOPHEN 1 TABLET: 5; 325 TABLET ORAL at 13:26

## 2020-10-23 RX ADMIN — KETOROLAC TROMETHAMINE 30 MG: 30 INJECTION, SOLUTION INTRAMUSCULAR at 06:00

## 2020-10-23 RX ADMIN — FERROUS SULFATE TAB EC 324 MG (65 MG FE EQUIVALENT) 324 MG: 324 (65 FE) TABLET DELAYED RESPONSE at 13:26

## 2020-10-23 RX ADMIN — Medication 10 ML: at 06:03

## 2020-10-23 RX ADMIN — FERROUS SULFATE TAB EC 324 MG (65 MG FE EQUIVALENT) 324 MG: 324 (65 FE) TABLET DELAYED RESPONSE at 19:13

## 2020-10-23 RX ADMIN — ENOXAPARIN SODIUM 40 MG: 40 INJECTION SUBCUTANEOUS at 09:23

## 2020-10-23 RX ADMIN — OXYCODONE HYDROCHLORIDE AND ACETAMINOPHEN 1 TABLET: 5; 325 TABLET ORAL at 09:23

## 2020-10-23 ASSESSMENT — PAIN DESCRIPTION - ORIENTATION
ORIENTATION: LOWER
ORIENTATION: LOWER

## 2020-10-23 ASSESSMENT — PAIN DESCRIPTION - FREQUENCY
FREQUENCY: CONTINUOUS
FREQUENCY: CONTINUOUS

## 2020-10-23 ASSESSMENT — PAIN - FUNCTIONAL ASSESSMENT
PAIN_FUNCTIONAL_ASSESSMENT: ACTIVITIES ARE NOT PREVENTED

## 2020-10-23 ASSESSMENT — PAIN SCALES - GENERAL
PAINLEVEL_OUTOF10: 0
PAINLEVEL_OUTOF10: 2
PAINLEVEL_OUTOF10: 3
PAINLEVEL_OUTOF10: 0
PAINLEVEL_OUTOF10: 0
PAINLEVEL_OUTOF10: 3
PAINLEVEL_OUTOF10: 1
PAINLEVEL_OUTOF10: 0
PAINLEVEL_OUTOF10: 4
PAINLEVEL_OUTOF10: 4
PAINLEVEL_OUTOF10: 3
PAINLEVEL_OUTOF10: 0

## 2020-10-23 ASSESSMENT — PAIN DESCRIPTION - ONSET
ONSET: GRADUAL
ONSET: GRADUAL

## 2020-10-23 ASSESSMENT — PAIN DESCRIPTION - PROGRESSION
CLINICAL_PROGRESSION: RESOLVED
CLINICAL_PROGRESSION: GRADUALLY IMPROVING
CLINICAL_PROGRESSION: RESOLVED
CLINICAL_PROGRESSION: RESOLVED

## 2020-10-23 ASSESSMENT — PAIN DESCRIPTION - DESCRIPTORS
DESCRIPTORS: SORE
DESCRIPTORS: SORE

## 2020-10-23 ASSESSMENT — PAIN DESCRIPTION - PAIN TYPE
TYPE: SURGICAL PAIN
TYPE: SURGICAL PAIN

## 2020-10-23 ASSESSMENT — PAIN DESCRIPTION - LOCATION
LOCATION: ABDOMEN
LOCATION: ABDOMEN

## 2020-10-23 NOTE — PLAN OF CARE
Problem: Discharge Planning:  Goal: Discharged to appropriate level of care  Description: Discharged to appropriate level of care  10/23/2020 0727 by Luis Hills RN  Outcome: Ongoing  10/22/2020 1737 by Odette Homans, RN  Outcome: Ongoing     Problem: Fluid Volume - Imbalance:  Goal: Absence of postpartum hemorrhage signs and symptoms  Description: Absence of postpartum hemorrhage signs and symptoms  10/23/2020 0727 by Luis Hills RN  Outcome: Ongoing  10/22/2020 1737 by Odette Homans, RN  Outcome: Ongoing  Goal: Absence of imbalanced fluid volume signs and symptoms  Description: Absence of imbalanced fluid volume signs and symptoms  Outcome: Ongoing     Problem: Infection - Surgical Site:  Goal: Will show no infection signs and symptoms  Description: Will show no infection signs and symptoms  10/23/2020 0727 by Luis Hills RN  Outcome: Ongoing  10/22/2020 1737 by Odette Homans, RN  Outcome: Ongoing     Problem: Mood - Altered:  Goal: Mood stable  Description: Mood stable  10/23/2020 0727 by Luis Hills RN  Outcome: Ongoing  10/22/2020 1737 by Odette Homans, RN  Outcome: Ongoing     Problem: Nausea/Vomiting:  Goal: Absence of nausea/vomiting  Description: Absence of nausea/vomiting  10/23/2020 0727 by Luis Hills RN  Outcome: Ongoing  10/22/2020 1737 by Odette Homans, RN  Outcome: Met This Shift     Problem: Pain - Acute:  Goal: Pain level will decrease  Description: Pain level will decrease  10/23/2020 0727 by Luis Hills RN  Outcome: Ongoing  10/22/2020 1737 by Odette Homans, RN  Outcome: Ongoing     Problem: Urinary Retention:  Goal: Urinary elimination within specified parameters  Description: Urinary elimination within specified parameters  10/23/2020 0727 by Luis Hills RN  Outcome: Ongoing  10/22/2020 1737 by Odette Homans, RN  Outcome: Ongoing     Problem: Venous Thromboembolism:  Goal: Will show no signs or symptoms of venous thromboembolism  Description: Will show no signs or symptoms of venous thromboembolism  10/23/2020 0727 by Guadalupe Hodges RN  Outcome: Ongoing  10/22/2020 1737 by Mini Hernandez RN  Outcome: Met This Shift  Goal: Absence of signs or symptoms of impaired coagulation  Description: Absence of signs or symptoms of impaired coagulation  Outcome: Ongoing

## 2020-10-23 NOTE — LACTATION NOTE
This note was copied from a baby's chart. LC to room. Mother states breastfeeding is going well, despite the Tongue Tie. LC discussed follow up with the [place] [facility] and Resource List given yesterday, as well as hand expression after every feeding and breast compressions during feeding to help with milk transfer. Mother agreed. Mother denies any further needs at this time.

## 2020-10-23 NOTE — PLAN OF CARE
Problem: Discharge Planning:  Goal: Discharged to appropriate level of care  Description: Discharged to appropriate level of care  10/23/2020 1259 by Emani Allen RN  Outcome: Ongoing  10/23/2020 0727 by Roark Hammans, RN  Outcome: Ongoing     Problem: Fluid Volume - Imbalance:  Goal: Absence of postpartum hemorrhage signs and symptoms  Description: Absence of postpartum hemorrhage signs and symptoms  10/23/2020 1259 by Emani Allen RN  Outcome: Ongoing  10/23/2020 0727 by Roark Hammans, RN  Outcome: Ongoing  Goal: Absence of imbalanced fluid volume signs and symptoms  Description: Absence of imbalanced fluid volume signs and symptoms  10/23/2020 1259 by Emani Allen RN  Outcome: Ongoing  10/23/2020 0727 by Roark Hammans, RN  Outcome: Ongoing     Problem: Infection - Surgical Site:  Goal: Will show no infection signs and symptoms  Description: Will show no infection signs and symptoms  10/23/2020 1259 by Emani Allen RN  Outcome: Ongoing  10/23/2020 0727 by Roark Hammans, RN  Outcome: Ongoing     Problem: Mood - Altered:  Goal: Mood stable  Description: Mood stable  10/23/2020 1259 by Emani Allen RN  Outcome: Ongoing  10/23/2020 0727 by Roark Hammans, RN  Outcome: Ongoing     Problem: Nausea/Vomiting:  Goal: Absence of nausea/vomiting  Description: Absence of nausea/vomiting  10/23/2020 1259 by Emani Allen RN  Outcome: Ongoing  10/23/2020 0727 by Roark Hammans, RN  Outcome: Ongoing     Problem: Pain - Acute:  Goal: Pain level will decrease  Description: Pain level will decrease  10/23/2020 1259 by Emani Allen RN  Outcome: Ongoing  10/23/2020 0727 by Roark Hammans, RN  Outcome: Ongoing     Problem: Urinary Retention:  Goal: Urinary elimination within specified parameters  Description: Urinary elimination within specified parameters  10/23/2020 1259 by Emani Allen RN  Outcome: Ongoing  10/23/2020 0727 by Roark Hammans, RN  Outcome: Ongoing     Problem: Venous Thromboembolism:  Goal: Will show no signs or symptoms of venous thromboembolism  Description: Will show no signs or symptoms of venous thromboembolism  10/23/2020 1259 by Monique Calle RN  Outcome: Ongoing  10/23/2020 0727 by Susan Zamora RN  Outcome: Ongoing  Goal: Absence of signs or symptoms of impaired coagulation  Description: Absence of signs or symptoms of impaired coagulation  10/23/2020 1259 by Monique Calle RN  Outcome: Ongoing  10/23/2020 0727 by Susan Zamora RN  Outcome: Ongoing

## 2020-10-23 NOTE — FLOWSHEET NOTE
Mother in bed holding infant. States that she is doing ok. Not currently in any pain. Encouraged rest today. Breastfeeding well. Abdominal incision with old vaginal blood, no new drainage noted, no s/s of infection. VSS.

## 2020-10-23 NOTE — ADT AUTH CERT
Harmonytammy KayeChristian U 61. #[id number] (LARISA:[id number]) (42 y.o.  F) (Adm: 10/22/20)   TZ [id number]   Delivery Summary     Vazquez Hassan [[id number]]   pregnancy 2020 (10/22/20 to present)   Birth Date:  [birth year]  Age (as of 10/23/20):  38  Ethnicity:  Non-/Non   Race:  White    History:    Estimated Date of Delivery:  10/23/20  Gestational Age:  37w11d  Blood Type:  B POS    OB History         4     Para    4     Term    4         0     AB    0     Living    3       SAB    0     TAB    0     Ectopic    0     Molar          Multiple    0     Live Births    3           #  Outcome  Date  GA  Labor/2nd  Weight  Sex  Delivery  Anes  PTL  Lv  A1  A5    1  Term   40w3d   9 lb 5.2 oz (4.23 kg)  M  CS-LTranv  Epidural  N  Living  8  9    Name: Regis Manrique    Location: Other    Delivering Clinician: Dr. Bre Campa       2  Term  12     M  Vag-Vacuum  Epidural   Living      Location: Other       3  Term  10/19/17  39w6d   8 lb 6 oz (3.8 kg)  M  CS-LTranv     9  9    Name: Graciela Mehrdadjo    Location: Other       4  Term  10/22/20  39w6d   7 lb 8.3 oz (3.41 kg)  M  CS-LTranv  Spinal  N  Living  6  9    Name: Shen VALENCIA    Location: Other    Delivering Clinician: Alice Puga MD       Dating Summary     Working NAOMI: 10/23/20  set by Alex Salazar RN on 10/22/20 based on Other Basis    Based On  NAOMI  GA Dif  [address]  Date    Other Basis   0 Comment: per pt  10/23/20  Working   Alex Salazar RN  10/22/20    Prenatal Results     1st Trimester     Test  Value  Reference Range  Date  Time    ABO/Rh   B POS   10/22/20  0745    Antibody        HCT        HGB        Rubella        RPR        Urine Prot        HBsAg  * negative   14     HIV        Gonorrhea        Chlamydia        TSH        TB        Pap        2nd Trimester     Test  Value  Reference Range  Date  Time    HCT        HGB        Glucose        3rd Trimester     Test  Value  Reference Range  Date  Time    HCT  25.5 %   36.0 - 48.0  10/23/20  0700        30.1 %   36.0 - 48.0  10/22/20  1536        34.9 %   36.0 - 48.0  10/22/20  0745    HGB   8.9 g/dL   12.0 - 16.0  10/23/20  0700        10.3 g/dL   12.0 - 16.0  10/22/20  1536        11.7 g/dL   12.0 - 16.0  10/22/20  0745    GBS         Genetic Screening     Test  Value  Reference Range  Date  Time    BUN        Cystic Fibrosis        Canavan        Thalessemia        Sickle Cell        [facility]        External Labs     Test  Value  Reference Range  Date  Time    ABO        Rh Factor        Rhogam        HIV  * negative   20     RPR  * non-reactive   20     Rubella Titer  * immune   20     Hepatitis B  * negative   20     C. Trachomatis  * negative   20     N.  Gonorrhoeae  * negative   20     GBS  * positive   20     Hepatitis C Antibody  * negative   20        Legend     *: Historical   View all results for this pregnancy    Blu Alvarado Marck Wells [[id number]]      Information     Head delivery date/time: 10/22/2020 09:41:00    Changing the 's delivery date/time could affect patient care.:     Delivery date/time:   10/22/20  0941    Delivery type: , Low Transverse    Details:   Trial of labor?: No     categorization: Repeat     priority: Scheduled    Indications for : Prior Uterine Surgery    Skin Incision Type: Low Transverse    Uterine Incision: Low Transverse       Start Pushing     Labor onset date/time:    Now    Dilation complete date/time:    Now    Start pushing date/time:     Decision date/time (emergent ):     Labor Length     3rd stage: 0h 02m   Delivery Providers     Delivering clinician: Sarbjit Bloom MD   Provider  Role    Nicole Ayala RN  Registered Nurse    Mylene Braga RN  Registered Nurse    Mirela Noonan  OB Tech    Carlos Enrique Melgar  OB Tech    Chintan Welchmin, APRN - CRNA  Nurse Anesthetist    Anesthesia Method: Spinal   Presentation     Presentation: Vertex   Operative Delivery     Forceps attempted?: No   Vacuum extractor attempted?: No   Shoulder Dystocia     Shoulder dystocia present?: No    Measurements     Weight: 3410 g  Length: 52.7 cm    Head circumference: 35.5 cm     Abdominal girth: 35 cm     Placenta     Placenta delivery date/time: 10/22/2020 0943   Placenta removal: Manual Removal   Placenta appearance: Intact   Placenta disposition: Refrigerator   Vaginal Counts        Sponges  Needles  Instruments    Initial counts       Final counts       Skin to Skin     Reason skin to skin not initiated:  Acuity   Stephan Haile [[id number]]      Information     Head delivery date/time: 10/22/2020 09:41:00    Changing the 's delivery date/time could affect patient care.:     Delivery date/time:   10/22/20  0941    Delivery type: , Low Transverse    Details:   Trial of labor?: No     categorization: Repeat     priority: Scheduled    Indications for : Prior Uterine Surgery    Skin Incision Type: Low Transverse    Uterine Incision: Low Transverse       Delivery Providers     Delivering clinician: Bola Bae MD   Provider  Role    Tiny Vincent RN  Registered Nurse    Marnie Dickey RN  Registered Nurse    Caity Dallas  OB Tech    Kacia Kd  OB Tech    Micheline Shen, APRN - CRNA  Nurse Anesthetist    Apgars     Living status: Living    Apgars assigned by: Edd Ritter  Component  1 min. 5 min.     Skin color:  1  1    Heart rate:  2  2    Reflex irritability:  1  2    Muscle tone:  1  2    Respiratory effort:  1  2    Total:  6    9    Cord     Vessels: 3 Vessels   Complications: None   Delayed cord clamping?: Yes   Cord clamped date/time: 10/22/2020 0942   Cord blood disposition: Discard   Gases sent?: No   Stem cell collection (by provider): No   [place] Measurements     Weight: 3410 g Length: 52.7 cm    Head circumference: 35.5 cm     Abdominal girth: 35 cm     Placenta     Placenta delivery date/time: 10/22/2020 0943   Placenta removal: Manual Removal   Placenta appearance: Intact   Placenta disposition: Refrigerator   Procedures     Procedures: None

## 2020-10-23 NOTE — LACTATION NOTE
This note was copied from a baby's chart. LC to room. Mother breastfeeding infant at this time, asked about use of nipple shields for infant. Mother states 3 pm feeding was a little painful. [facility] reviewed Care Plan for use of nipple shield, mother states this feeding is going better and not wanting to have to do the extra work at this time with feedings. LC reinforced importance of positioning infant nose to nipple, belly to belly, waiting for wide open mouth, and bringing baby onto breast to ensure a deep latch. LC reviewed hand expression and breast massage before feedings with mother. Mother agreed. LC gave lanolin and instructed mother in use and increased risk of yeast infection. Discussed allowing drops of expressed milk/colostrum to air-dry on breast before applying a teardrop of lanolin to the damaged area only. Mother agreed and denies any further needs at this time.

## 2020-10-23 NOTE — PROGRESS NOTES
Department of Obstetrics and Gynecology   Postpartum Rounds    SUBJECTIVE:  Pain is moderately controlled with non-steroidal anti-inflammatory drugs or narcotic analgesics. The patient is tolerating regular diet. She is ambulating. Her lochia is normal.    OBJECTIVE:  Vital Signs: BP 98/60   Pulse 72   Temp 98.1 °F (36.7 °C) (Oral)   Resp 18   Ht 5' 2\" (1.575 m)   Wt 139 lb (63 kg)   SpO2 98%   Breastfeeding Unknown   BMI 25.42 kg/m²   Appearance/Psychiatric: awake, alert, cooperative, no apparent distress, appears stated age  Constitutional: The patient is well nourished. Cardiovascular: She does not have edema. Respiratory: Respiratory effort is normal.  Gastrointestinal: Soft, appropriately tender, uterine fundus is firm below umbilicus  The bandage is clean and dry  Extremities: nontender to palpation    LABS / IMAGING:  Component      Latest Ref Rng & Units 10/23/2020 10/22/2020           7:00 AM  3:36 PM   WBC      4.0 - 11.0 K/uL 9.2 15.9 (H)   RBC      4.00 - 5.20 M/uL 3.02 (L) 3.53 (L)   Hemoglobin Quant      12.0 - 16.0 g/dL 8.9 (L) 10.3 (L)   Hematocrit      36.0 - 48.0 % 25.5 (L) 30.1 (L)   MCV      80.0 - 100.0 fL 84.6 85.3   MCH      26.0 - 34.0 pg 29.3 29.0   MCHC      31.0 - 36.0 g/dL 34.7 34.0   RDW      12.4 - 15.4 % 15.2 15.0   Platelet Count      483 - 450 K/uL 149 182   MPV      5.0 - 10.5 fL 8.1 8.1   Neutrophils %      % 80.1 91.5   Lymphocyte %      % 11.1 4.2   Monocytes %      % 8.1 4.2   Eosinophils %      % 0.5 0.0   Basophils %      % 0.2 0.1   Neutrophils Absolute      1.7 - 7.7 K/uL 7.4 14.5 (H)   Lymphocytes Absolute      1.0 - 5.1 K/uL 1.0 0.7 (L)   Monocytes Absolute      0.0 - 1.3 K/uL 0.7 0.7   Eosinophils Absolute      0.0 - 0.6 K/uL 0.1 0.0   Basophils Absolute      0.0 - 0.2 K/uL 0.0 0.0       ASSESSMENT:    Postoperative Day 1 s/p Planned Repeat  complicated by PPH    PLAN:   1. Advance postoperative care as tolerated  2. PPH - bleeding stable. Asymptomatic anemia. Iron BID. 3. Discharge home on Postpartum Day 2-3 pending pain control  4. Return to office in 6 weeks   5.  Postpartum instructions reviewed and all patient's Questions answered    Electronically signed by Gerald Keating MD on 10/23/2020 at 7:32 AM

## 2020-10-24 PROCEDURE — 1220000000 HC SEMI PRIVATE OB R&B

## 2020-10-24 PROCEDURE — 6370000000 HC RX 637 (ALT 250 FOR IP): Performed by: OBSTETRICS & GYNECOLOGY

## 2020-10-24 PROCEDURE — 6360000002 HC RX W HCPCS: Performed by: OBSTETRICS & GYNECOLOGY

## 2020-10-24 RX ORDER — PSEUDOEPHEDRINE HCL 30 MG
100 TABLET ORAL 2 TIMES DAILY
Qty: 60 CAPSULE | Refills: 1 | Status: SHIPPED | OUTPATIENT
Start: 2020-10-24

## 2020-10-24 RX ORDER — OXYCODONE HYDROCHLORIDE AND ACETAMINOPHEN 5; 325 MG/1; MG/1
1 TABLET ORAL EVERY 6 HOURS PRN
Qty: 28 TABLET | Refills: 0 | Status: SHIPPED | OUTPATIENT
Start: 2020-10-24 | End: 2020-10-31

## 2020-10-24 RX ORDER — IBUPROFEN 800 MG/1
800 TABLET ORAL EVERY 8 HOURS PRN
Qty: 60 TABLET | Refills: 1 | Status: SHIPPED | OUTPATIENT
Start: 2020-10-24

## 2020-10-24 RX ORDER — FERROUS SULFATE TAB EC 324 MG (65 MG FE EQUIVALENT) 324 (65 FE) MG
324 TABLET DELAYED RESPONSE ORAL 2 TIMES DAILY WITH MEALS
Qty: 60 TABLET | Refills: 1 | Status: SHIPPED | OUTPATIENT
Start: 2020-10-24

## 2020-10-24 RX ADMIN — FERROUS SULFATE TAB EC 324 MG (65 MG FE EQUIVALENT) 324 MG: 324 (65 FE) TABLET DELAYED RESPONSE at 08:02

## 2020-10-24 RX ADMIN — DOCUSATE SODIUM 100 MG: 100 CAPSULE ORAL at 08:02

## 2020-10-24 RX ADMIN — ACETAMINOPHEN 650 MG: 325 TABLET ORAL at 15:21

## 2020-10-24 RX ADMIN — IBUPROFEN 800 MG: 400 TABLET, FILM COATED ORAL at 01:10

## 2020-10-24 RX ADMIN — OXYCODONE HYDROCHLORIDE AND ACETAMINOPHEN 1 TABLET: 5; 325 TABLET ORAL at 04:45

## 2020-10-24 RX ADMIN — FERROUS SULFATE TAB EC 324 MG (65 MG FE EQUIVALENT) 324 MG: 324 (65 FE) TABLET DELAYED RESPONSE at 17:10

## 2020-10-24 RX ADMIN — IBUPROFEN 800 MG: 400 TABLET, FILM COATED ORAL at 12:11

## 2020-10-24 RX ADMIN — IBUPROFEN 800 MG: 400 TABLET, FILM COATED ORAL at 20:06

## 2020-10-24 RX ADMIN — ENOXAPARIN SODIUM 40 MG: 40 INJECTION SUBCUTANEOUS at 08:21

## 2020-10-24 RX ADMIN — DOCUSATE SODIUM 100 MG: 100 CAPSULE ORAL at 20:06

## 2020-10-24 ASSESSMENT — PAIN SCALES - GENERAL
PAINLEVEL_OUTOF10: 4
PAINLEVEL_OUTOF10: 3
PAINLEVEL_OUTOF10: 3

## 2020-10-24 ASSESSMENT — PAIN DESCRIPTION - DESCRIPTORS
DESCRIPTORS: ACHING;CRAMPING
DESCRIPTORS: ACHING;SORE;CRAMPING

## 2020-10-24 ASSESSMENT — PAIN DESCRIPTION - RADICULAR PAIN: RADICULAR_PAIN: ABSENT

## 2020-10-24 NOTE — DISCHARGE SUMMARY
Department of Obstetrics and Gynecology  Postpartum Discharge Summary      Admit Date: 10/22/2020    Admit Diagnosis: History of  [Z98.891]    Discharge Date: 10/25/2020 (summary placed the day prior to discharge for delivery purposes and updated as needed)    Discharge Diagnoses: repeat C section     Priscilla Peace   Home Medication Instructions RAR:[id number]    Printed on:10/24/20 0817   Medication Information                      docusate sodium (COLACE, DULCOLAX) 100 MG CAPS  Take 100 mg by mouth 2 times daily             ferrous sulfate 324 (65 Fe) MG EC tablet  Take 1 tablet by mouth 2 times daily (with meals)             ibuprofen (ADVIL;MOTRIN) 800 MG tablet  Take 1 tablet by mouth every 8 hours as needed for Pain             oxyCODONE-acetaminophen (PERCOCET) 5-325 MG per tablet  Take 1 tablet by mouth every 6 hours as needed for Pain for up to 7 days. Prenatal Vit-Fe Fumarate-FA (PRENATAL VITAMIN) 27-0.8 MG TABS  Take by mouth                 Service: Obstetrics    Consults: none    Significant Diagnostic Studies: none    Postpartum complications: postpartum hemorrhage     Condition at Discharge: good    Hospital Course: Pt presented for scheduled RLTCS. Please see operative note for details. Her postpartum course was complicated by a PPH requiring evacuation of a large uterine clot and Methergine for 24 hours. She recieved one extra dose of antibiotics. Her anemia was asymptomatic and she was discharged home in good condition. Discharge Instructions: Activity: no heavy lifting, pushing, pulling for 6 weeks, no sex for 6 weeks and as tolerated    Diet: regular diet    Instructions: No intercourse and nothing in the vagina for 6 weeks. Do not drive while using pain medications.  Keep any wounds clean and dry    Discharge to: Home    Disposition / Follow up: Return to office in 6 weeks    Home Health Nurse visit within 24-48 h if qualifies    [place] Data:  Weight Information for the patient's :  Torin Tone Marck Wlels [[id number]]        Apgars   Information for the patient's :  Germán Wells [[id number]]         Home with mother    Electronically signed by Mary Lutz MD on 10/24/2020 at 8:17 AM

## 2020-10-24 NOTE — LACTATION NOTE
This note was copied from a baby's chart. LC to room. Mother states breastfeeding is going well. Mother states pain with feedings has gone down over night. Mother states her milk is transitioning at this time. Mother states infant has spit up some after feedings, encouraged mother to keep infant upright for 10-20 minutes after feedings. Mother agreed and denies any further needs at this time.

## 2020-10-24 NOTE — PLAN OF CARE
Problem: Discharge Planning:  Goal: Discharged to appropriate level of care  Description: Discharged to appropriate level of care  Outcome: Ongoing     Problem: Fluid Volume - Imbalance:  Goal: Absence of postpartum hemorrhage signs and symptoms  Description: Absence of postpartum hemorrhage signs and symptoms  Outcome: Ongoing  Note: Bleeding remains WNL  Goal: Absence of imbalanced fluid volume signs and symptoms  Description: Absence of imbalanced fluid volume signs and symptoms  Outcome: Ongoing     Problem: Infection - Surgical Site:  Goal: Will show no infection signs and symptoms  Description: Will show no infection signs and symptoms  Outcome: Ongoing     Problem: Mood - Altered:  Goal: Mood stable  Description: Mood stable  Outcome: Ongoing     Problem: Nausea/Vomiting:  Goal: Absence of nausea/vomiting  Description: Absence of nausea/vomiting  Outcome: Ongoing     Problem: Pain - Acute:  Goal: Pain level will decrease  Description: Pain level will decrease  Outcome: Ongoing  Note: Pain well controlled at this time     Problem: Urinary Retention:  Goal: Urinary elimination within specified parameters  Description: Urinary elimination within specified parameters  Outcome: Ongoing     Problem: Venous Thromboembolism:  Goal: Will show no signs or symptoms of venous thromboembolism  Description: Will show no signs or symptoms of venous thromboembolism  Outcome: Ongoing  Goal: Absence of signs or symptoms of impaired coagulation  Description: Absence of signs or symptoms of impaired coagulation  Outcome: Ongoing     Problem: Pain:  Goal: Pain level will decrease  Description: Pain level will decrease  Outcome: Ongoing  Note: Pain well controlled at this time  Goal: Control of acute pain  Description: Control of acute pain  Outcome: Ongoing  Goal: Control of chronic pain  Description: Control of chronic pain  Outcome: Ongoing

## 2020-10-24 NOTE — PROGRESS NOTES
Department of Obstetrics and Gynecology   Postpartum Rounds    SUBJECTIVE:  Pain is controlled with non-steroidal anti-inflammatory drugs or narcotic analgesics. The patient is tolerating regular diet. She is ambulating. Her lochia is normal.  She had an episode of near syncope yesterday while in the shower, but denies lightheadedness or dizziness since. OBJECTIVE:  Vital Signs: BP (!) 94/59   Pulse 90   Temp 97.7 °F (36.5 °C) (Oral)   Resp 20   Ht 5' 2\" (1.575 m)   Wt 139 lb (63 kg)   SpO2 98%   Breastfeeding Unknown   BMI 25.42 kg/m²   Appearance/Psychiatric: awake, alert, cooperative, no apparent distress, appears stated age  Constitutional: The patient is well nourished. Cardiovascular: She does not have edema. Respiratory: Respiratory effort is normal.  Gastrointestinal: Soft, appropriately tender, uterine fundus is firm below umbilicus  The incision is clean, dry, intact. Saturated steri strips replaced with sterile technique.   Extremities: nontender to palpation    LABS / IMAGING:  Component      Latest Ref Rng & Units 10/23/2020 10/22/2020           7:00 AM  3:36 PM   WBC      4.0 - 11.0 K/uL 9.2 15.9 (H)   RBC      4.00 - 5.20 M/uL 3.02 (L) 3.53 (L)   Hemoglobin Quant      12.0 - 16.0 g/dL 8.9 (L) 10.3 (L)   Hematocrit      36.0 - 48.0 % 25.5 (L) 30.1 (L)   MCV      80.0 - 100.0 fL 84.6 85.3   MCH      26.0 - 34.0 pg 29.3 29.0   MCHC      31.0 - 36.0 g/dL 34.7 34.0   RDW      12.4 - 15.4 % 15.2 15.0   Platelet Count      559 - 450 K/uL 149 182   MPV      5.0 - 10.5 fL 8.1 8.1   Neutrophils %      % 80.1 91.5   Lymphocyte %      % 11.1 4.2   Monocytes %      % 8.1 4.2   Eosinophils %      % 0.5 0.0   Basophils %      % 0.2 0.1   Neutrophils Absolute      1.7 - 7.7 K/uL 7.4 14.5 (H)   Lymphocytes Absolute      1.0 - 5.1 K/uL 1.0 0.7 (L)   Monocytes Absolute      0.0 - 1.3 K/uL 0.7 0.7   Eosinophils Absolute      0.0 - 0.6 K/uL 0.1 0.0   Basophils Absolute      0.0 - 0.2 K/uL 0.0 0.0 ASSESSMENT:    Postoperative Day 2 s/p Planned Repeat  complicated by PPH    PLAN:   1. Advance postoperative care as tolerated  2. PPH - Iron BID. 3. Discharge home on Postpartum Day 2-3 pending pain control  4. Return to office in 6 weeks   5.  Postpartum instructions reviewed and all patient's Questions answered    Electronically signed by Gerald Keating MD on 10/24/2020 at 7:25 AM

## 2020-10-24 NOTE — FLOWSHEET NOTE
Pt sitting up on the couch, took a shower with no issues. Tylenol given, pt denies needing anything at this time, call light within reach.

## 2020-10-24 NOTE — FLOWSHEET NOTE
Report received, care assumed. Pt sitting up in rocking chair beastfeeding infant.   Denies needs a this time

## 2020-10-24 NOTE — LACTATION NOTE
This note was copied from a baby's chart. LC to room. Mother states breastfeeding is going better now, less painful when latching and easy to hand express. Mother denies any further needs at this time. LC encouraged mother to continue with skin to skin, hand expression and try to rest between feedings. Mother agreed.

## 2020-10-24 NOTE — FLOWSHEET NOTE
Infant fussy during exam wanting to breastfeed, infant put to the breast and latched easily. Told the pt to call this RN when finished to complete her morning assessment and give her Lovenox shot.

## 2020-10-25 VITALS
HEART RATE: 76 BPM | BODY MASS INDEX: 25.58 KG/M2 | TEMPERATURE: 97.4 F | WEIGHT: 139 LBS | DIASTOLIC BLOOD PRESSURE: 65 MMHG | OXYGEN SATURATION: 98 % | RESPIRATION RATE: 16 BRPM | SYSTOLIC BLOOD PRESSURE: 98 MMHG | HEIGHT: 62 IN

## 2020-10-25 PROCEDURE — 6360000002 HC RX W HCPCS: Performed by: OBSTETRICS & GYNECOLOGY

## 2020-10-25 PROCEDURE — 6370000000 HC RX 637 (ALT 250 FOR IP): Performed by: OBSTETRICS & GYNECOLOGY

## 2020-10-25 RX ADMIN — IBUPROFEN 800 MG: 400 TABLET, FILM COATED ORAL at 04:11

## 2020-10-25 RX ADMIN — ENOXAPARIN SODIUM 40 MG: 40 INJECTION SUBCUTANEOUS at 08:28

## 2020-10-25 RX ADMIN — FERROUS SULFATE TAB EC 324 MG (65 MG FE EQUIVALENT) 324 MG: 324 (65 FE) TABLET DELAYED RESPONSE at 08:29

## 2020-10-25 RX ADMIN — DOCUSATE SODIUM 100 MG: 100 CAPSULE ORAL at 08:29

## 2020-10-25 RX ADMIN — ACETAMINOPHEN 650 MG: 325 TABLET ORAL at 08:29

## 2020-10-25 ASSESSMENT — PAIN SCALES - GENERAL
PAINLEVEL_OUTOF10: 2
PAINLEVEL_OUTOF10: 3

## 2020-10-25 NOTE — PROGRESS NOTES
RN to bedside. Pt awake. Baby in bassinet on back. Updated whiteboard. No needs expressed at this time.

## 2020-10-25 NOTE — LACTATION NOTE
This note was copied from a baby's chart. LC to room. Mother states breastfeeding is going well. LC reviewed hand expression, Reverse Pressure Softening for engorgement and use of breast compressions with active sucks until Tongue Tie is evaluated. LC also discussed pumping after every other feeding if needed. Mother agreed and denies any further needs at this time.

## 2020-10-25 NOTE — PROGRESS NOTES
Postpartum and infant care teaching completed and forms signed by patient. Copy witnessed by RN and given to patient. Patient verbalized understanding of all teaching points. Prescriptions given for doclace, ibuprofen, oxycodone, fe. Patient plans to follow-up with [employer] Provider as instructed. Patient verbalizes understanding of discharge instructions and denies further questions. ID bands checked. Mother's ID band and one of baby's ID bands removed and taped to footprint sheet, signed by patient and witnessed by RN. Patient discharged in stable condition accompanied by family/guardian. Discharged in wheelchair, holding baby in arms.

## 2020-10-25 NOTE — PLAN OF CARE
Problem: Fluid Volume - Imbalance:  Goal: Absence of postpartum hemorrhage signs and symptoms  Description: Absence of postpartum hemorrhage signs and symptoms  Outcome: Met This Shift  Goal: Absence of imbalanced fluid volume signs and symptoms  Description: Absence of imbalanced fluid volume signs and symptoms  Outcome: Met This Shift     Problem: Infection - Surgical Site:  Goal: Will show no infection signs and symptoms  Description: Will show no infection signs and symptoms  Outcome: Met This Shift     Problem: Mood - Altered:  Goal: Mood stable  Description: Mood stable  Outcome: Met This Shift     Problem: Urinary Retention:  Goal: Urinary elimination within specified parameters  Description: Urinary elimination within specified parameters  Outcome: Met This Shift     Problem: Venous Thromboembolism:  Goal: Will show no signs or symptoms of venous thromboembolism  Description: Will show no signs or symptoms of venous thromboembolism  Outcome: Met This Shift  Goal: Absence of signs or symptoms of impaired coagulation  Description: Absence of signs or symptoms of impaired coagulation  Outcome: Met This Shift

## 2020-10-25 NOTE — PLAN OF CARE
Problem: Discharge Planning:  Goal: Discharged to appropriate level of care  Description: Discharged to appropriate level of care  10/25/2020 0736 by Sam Chavez RN  Outcome: Completed  10/25/2020 0259 by Karley Ortega RN  Outcome: Ongoing  Note: Discussed discharge medications     Problem: Fluid Volume - Imbalance:  Goal: Absence of postpartum hemorrhage signs and symptoms  Description: Absence of postpartum hemorrhage signs and symptoms  10/25/2020 0736 by Sam Chavez RN  Outcome: Completed  10/25/2020 0259 by Karley Ortega RN  Outcome: Met This Shift  Goal: Absence of imbalanced fluid volume signs and symptoms  Description: Absence of imbalanced fluid volume signs and symptoms  10/25/2020 0736 by Sam Chavez RN  Outcome: Completed  10/25/2020 0259 by Karley Ortega RN  Outcome: Met This Shift     Problem: Infection - Surgical Site:  Goal: Will show no infection signs and symptoms  Description: Will show no infection signs and symptoms  10/25/2020 0736 by Sam Chavez RN  Outcome: Completed  10/25/2020 0259 by Karley Ortega RN  Outcome: Met This Shift     Problem: Mood - Altered:  Goal: Mood stable  Description: Mood stable  10/25/2020 0736 by Sam Chavez RN  Outcome: Completed  10/25/2020 0259 by Karley Ortega RN  Outcome: Met This Shift     Problem: Nausea/Vomiting:  Goal: Absence of nausea/vomiting  Description: Absence of nausea/vomiting  Outcome: Completed     Problem: Pain - Acute:  Goal: Pain level will decrease  Description: Pain level will decrease  10/25/2020 0736 by Sam Chavez RN  Outcome: Completed  10/25/2020 0259 by Karley Ortega RN  Outcome: Ongoing  Note: Pt taking Motrin for pain.        Problem: Urinary Retention:  Goal: Urinary elimination within specified parameters  Description: Urinary elimination within specified parameters  10/25/2020 0736 by Sam Chavez RN  Outcome: Completed  10/25/2020 0259 by Karley Ortega RN  Outcome: Met This Shift Problem: Venous Thromboembolism:  Goal: Will show no signs or symptoms of venous thromboembolism  Description: Will show no signs or symptoms of venous thromboembolism  10/25/2020 0736 by Sherry Calix RN  Outcome: Completed  10/25/2020 0259 by Anthony Marley RN  Outcome: Met This Shift  Goal: Absence of signs or symptoms of impaired coagulation  Description: Absence of signs or symptoms of impaired coagulation  10/25/2020 0736 by Sherry Calix RN  Outcome: Completed  10/25/2020 0259 by Anthony Marley RN  Outcome: Met This Shift     Problem: Pain:  Goal: Pain level will decrease  Description: Pain level will decrease  10/25/2020 0736 by Sherry Calix RN  Outcome: Completed  10/25/2020 0259 by Anthony Marley RN  Outcome: Ongoing  Note: Pt taking Motrin for pain.     Goal: Control of acute pain  Description: Control of acute pain  Outcome: Completed  Goal: Control of chronic pain  Description: Control of chronic pain  Outcome: Completed

## 2022-08-02 ENCOUNTER — INITIAL CONSULT (OUTPATIENT)
Dept: VASCULAR SURGERY | Age: 37
End: 2022-08-02
Payer: COMMERCIAL

## 2022-08-02 VITALS — HEIGHT: 62 IN | WEIGHT: 119 LBS | BODY MASS INDEX: 21.9 KG/M2

## 2022-08-02 DIAGNOSIS — I83.899 SYMPTOMATIC SPIDER VARICOSE VEIN: ICD-10-CM

## 2022-08-02 DIAGNOSIS — I83.813 VARICOSE VEINS OF BILATERAL LOWER EXTREMITIES WITH PAIN: Primary | ICD-10-CM

## 2022-08-02 PROCEDURE — 99213 OFFICE O/P EST LOW 20 MIN: CPT | Performed by: SURGERY

## 2022-08-02 ASSESSMENT — ENCOUNTER SYMPTOMS
EYES NEGATIVE: 1
ALLERGIC/IMMUNOLOGIC NEGATIVE: 1
GASTROINTESTINAL NEGATIVE: 1
RESPIRATORY NEGATIVE: 1

## 2022-08-02 NOTE — PROGRESS NOTES
Daily Progress Note   Thony Cameron MD      2022    Chief Complaint   Patient presents with    Consultation    Varicose Veins         HISTORY OF PRESENT ILLNESS:                The patient is a 44 y.o. female who presents with a referral from Dr. Burak Justin for varicose veins. Mrs. Josh Cosby has had four children. She says her varicose veins started bothering her when she was pregnant with her second child. She says she is now done having children, and wants an opinion on the varicose veins. She is very active and exercises. She elevates her legs at night. She says her legs feel heavy and tired.      Past Medical History:   Diagnosis Date    Postpartum depression        Past Surgical History:   Procedure Laterality Date     SECTION       SECTION N/A 10/22/2020    repeat  SECTION low trasverse uterine incision at 0940 performed by Jaqui Padilla MD at [facility] L&D OR    WISDOM TOOTH EXTRACTION         Social History     Socioeconomic History    Marital status:      Spouse name: Not on file    Number of children: Not on file    Years of education: Not on file    Highest education level: Not on file   Occupational History    Not on file   Tobacco Use    Smoking status: Never    Smokeless tobacco: Never   Vaping Use    Vaping Use: Never used   Substance and Sexual Activity    Alcohol use: No    Drug use: No    Sexual activity: Yes     Partners: Male   Other Topics Concern    Not on file   Social History Narrative    Not on file     Social Determinants of Health     Financial Resource Strain: Not on file   Food Insecurity: Not on file   Transportation Needs: Not on file   Physical Activity: Not on file   Stress: Not on file   Social Connections: Not on file   Intimate Partner Violence: Not on file   Housing Stability: Not on file       Family History   Problem Relation Age of Onset    High Cholesterol Father     Diabetes Paternal Grandmother     Hypertension Paternal Grandmother     Cancer Paternal Grandfather         pancretic cancer         Current Outpatient Medications:     ibuprofen (ADVIL;MOTRIN) 800 MG tablet, Take 1 tablet by mouth every 8 hours as needed for Pain (Patient not taking: Reported on 8/2/2022), Disp: 60 tablet, Rfl: 1    ferrous sulfate 324 (65 Fe) MG EC tablet, Take 1 tablet by mouth 2 times daily (with meals) (Patient not taking: Reported on 8/2/2022), Disp: 60 tablet, Rfl: 1    docusate sodium (COLACE, DULCOLAX) 100 MG CAPS, Take 100 mg by mouth 2 times daily (Patient not taking: Reported on 8/2/2022), Disp: 60 capsule, Rfl: 1    Prenatal Vit-Fe Fumarate-FA (PRENATAL VITAMIN) 27-0.8 MG TABS, Take by mouth (Patient not taking: Reported on 8/2/2022), Disp: , Rfl:     Patient has no known allergies.     Vitals:    08/02/22 1428   Weight: 119 lb (54 kg)   Height: 5' 2\" (1.575 m)       Admission on 10/22/2020, Discharged on 10/25/2020   Component Date Value Ref Range Status    ABO/Rh 10/22/2020 B POS   Final    Antibody Screen 10/22/2020 NEG   Final    WBC 10/22/2020 6.3  4.0 - 11.0 K/uL Final    RBC 10/22/2020 4.12  4.00 - 5.20 M/uL Final    Hemoglobin 10/22/2020 11.7 (A) 12.0 - 16.0 g/dL Final    Hematocrit 10/22/2020 34.9 (A) 36.0 - 48.0 % Final    MCV 10/22/2020 84.7  80.0 - 100.0 fL Final    MCH 10/22/2020 28.5  26.0 - 34.0 pg Final    MCHC 10/22/2020 33.7  31.0 - 36.0 g/dL Final    RDW 10/22/2020 15.3  12.4 - 15.4 % Final    Platelets 25/69/6060 151  135 - 450 K/uL Final    MPV 10/22/2020 8.0  5.0 - 10.5 fL Final    Total Syphillis IgG/IgM 10/22/2020 Non-Reactive  Non-reactive Final    Amphetamine Screen, Urine 10/22/2020 Neg  Negative <1000ng/mL Final    Barbiturate Screen, Ur 10/22/2020 Neg  Negative <200 ng/mL Final    Benzodiazepine Screen, Urine 10/22/2020 Neg  Negative <200 ng/mL Final    Cannabinoid Scrn, Ur 10/22/2020 Neg  Negative <50 ng/mL Final    Cocaine Metabolite Screen, Urine 10/22/2020 Neg  Negative <300 ng/mL Final    Opiate Scrn, Ur 10/22/2020 Neg  Negative <300 ng/mL Final    Comment: \"Therapeutic levels of pain medication, especially oxycontin and synthetic  opioids, may not be detected by this Methodology. Pain management screen  panel  Drug panel-PM-Hi Res Ur, Interp (PAIN) should be considered for drug  monitoring \". PCP Screen, Urine 10/22/2020 Neg  Negative <25 ng/mL Final    Methadone Screen, Urine 10/22/2020 Neg  Negative <300 ng/mL Final    Propoxyphene Scrn, Ur 10/22/2020 Neg  Negative <300 ng/mL Final    Oxycodone Urine 10/22/2020 Neg  Negative <100 ng/ml Final    Buprenorphine Urine 10/22/2020 Neg  Negative <5 ng/ml Final    pH, UA 10/22/2020 7.0   Final    Comment: Urine pH less than 5.0 or greater than 8.0 may indicate sample adulteration. Another sample should be collected if clinically  indicated. Drug Screen Comment: 10/22/2020 see below   Final    Comment: This method is a screening test to detect only these drug  classes as part of a medical workup. Confirmatory testing  by another method should be ordered if clinically indicated. Hepatitis C Antibody, External Res* 04/08/2020 negative   Final    HIV, External Result 04/08/2020 negative   Final    RPR, External Result 04/08/2020 non-reactive   Final    GBS, External Result 09/30/2020 positive   Final    Hep B, External Result 04/08/2020 negative   Final    Rubella Titer, External Result 04/08/2020 immune   Final    C. Trachomatis, External Result 03/18/2020 negative   Final    N.  Gonorrhoeae, External Result 03/18/2020 negative   Final    WBC 10/22/2020 15.9 (A) 4.0 - 11.0 K/uL Final    RBC 10/22/2020 3.53 (A) 4.00 - 5.20 M/uL Final    Hemoglobin 10/22/2020 10.3 (A) 12.0 - 16.0 g/dL Final    Hematocrit 10/22/2020 30.1 (A) 36.0 - 48.0 % Final    MCV 10/22/2020 85.3  80.0 - 100.0 fL Final    MCH 10/22/2020 29.0  26.0 - 34.0 pg Final    MCHC 10/22/2020 34.0  31.0 - 36.0 g/dL Final    RDW 10/22/2020 15.0  12.4 - 15.4 % Final    Platelets 86/07/8158 182  372 - 450 K/uL Final    MPV 10/22/2020 8.1  5.0 - 10.5 fL Final    Neutrophils % 10/22/2020 91.5  % Final    Lymphocytes % 10/22/2020 4.2  % Final    Monocytes % 10/22/2020 4.2  % Final    Eosinophils % 10/22/2020 0.0  % Final    Basophils % 10/22/2020 0.1  % Final    Neutrophils Absolute 10/22/2020 14.5 (A) 1.7 - 7.7 K/uL Final    Lymphocytes Absolute 10/22/2020 0.7 (A) 1.0 - 5.1 K/uL Final    Monocytes Absolute 10/22/2020 0.7  0.0 - 1.3 K/uL Final    Eosinophils Absolute 10/22/2020 0.0  0.0 - 0.6 K/uL Final    Basophils Absolute 10/22/2020 0.0  0.0 - 0.2 K/uL Final    WBC 10/23/2020 9.2  4.0 - 11.0 K/uL Final    RBC 10/23/2020 3.02 (A) 4.00 - 5.20 M/uL Final    Hemoglobin 10/23/2020 8.9 (A) 12.0 - 16.0 g/dL Final    Hematocrit 10/23/2020 25.5 (A) 36.0 - 48.0 % Final    MCV 10/23/2020 84.6  80.0 - 100.0 fL Final    MCH 10/23/2020 29.3  26.0 - 34.0 pg Final    MCHC 10/23/2020 34.7  31.0 - 36.0 g/dL Final    RDW 10/23/2020 15.2  12.4 - 15.4 % Final    Platelets 89/39/0113 149  135 - 450 K/uL Final    MPV 10/23/2020 8.1  5.0 - 10.5 fL Final    Neutrophils % 10/23/2020 80.1  % Final    Lymphocytes % 10/23/2020 11.1  % Final    Monocytes % 10/23/2020 8.1  % Final    Eosinophils % 10/23/2020 0.5  % Final    Basophils % 10/23/2020 0.2  % Final    Neutrophils Absolute 10/23/2020 7.4  1.7 - 7.7 K/uL Final    Lymphocytes Absolute 10/23/2020 1.0  1.0 - 5.1 K/uL Final    Monocytes Absolute 10/23/2020 0.7  0.0 - 1.3 K/uL Final    Eosinophils Absolute 10/23/2020 0.1  0.0 - 0.6 K/uL Final    Basophils Absolute 10/23/2020 0.0  0.0 - 0.2 K/uL Final       Review of Systems   Constitutional: Negative. HENT: Negative. Eyes: Negative. Respiratory: Negative. Cardiovascular: Negative. Gastrointestinal: Negative. Endocrine: Negative. Genitourinary: Negative. Musculoskeletal: Negative. Skin: Negative. Allergic/Immunologic: Negative. Neurological: Negative. Hematological: Negative.     Psychiatric/Behavioral: Negative. All other systems reviewed and are negative. Physical Exam  Vitals and nursing note reviewed. Constitutional:       Appearance: Normal appearance. She is well-developed. HENT:      Mouth/Throat:      Pharynx: No oropharyngeal exudate. Eyes:      Conjunctiva/sclera: Conjunctivae normal.      Pupils: Pupils are equal, round, and reactive to light. Cardiovascular:      Rate and Rhythm: Normal rate and regular rhythm. Pulses:           Dorsalis pedis pulses are 2+ on the right side and 2+ on the left side. Posterior tibial pulses are 2+ on the right side and 2+ on the left side. Heart sounds: Normal heart sounds. Comments:     MEASUREMENTS 8/2/2022:    RIGHT ANKLE: 21.0 cm   RIGHT CALF: 36.6 cm     LEFT ANKLE: 21.5 cm   LEFT CALF: 36.8 cm       Pulmonary:      Effort: Pulmonary effort is normal.      Breath sounds: Normal breath sounds. Abdominal:      General: Bowel sounds are normal.       Musculoskeletal:         General: Normal range of motion. Cervical back: Normal range of motion. Legs:    Neurological:      Mental Status: She is alert and oriented to person, place, and time. Deep Tendon Reflexes: Reflexes are normal and symmetric. Psychiatric:         Speech: Speech normal.         Behavior: Behavior normal.         Thought Content: Thought content normal.         Judgment: Judgment normal.     Mrs. Yessica Maciel has had three c-sections. ASSESSMENT:    Problem List Items Addressed This Visit          Medium    Varicose veins of bilateral lower extremities with pain - Primary    Symptomatic spider varicose vein       PLAN:     Schedule to return in three months for a bilateral lower extremity reflux study and office visit. We discussed surgery how we do venous ablation and stab avulsions when indicated we discussed sclerotherapy of large and small veins  We are also giving you a prescription for 20- 30 mmHg compression stockings.  Try to wear these daily. Richy Conley MA am scribing for and in the presence of Rebeca Hernandez MD on this date of 08/02/22  I Fabio Bean MD personally performed the services described in this documentation as scribed by the Medical Assistant Mian Blanco in my presence and it is both accurate and complete.       Electronically signed by Rebeca Hernandez MD on 8/2/2022 at 4:37 PM

## 2022-08-02 NOTE — PATIENT INSTRUCTIONS
Schedule to return in three months for a bilateral lower extremity reflux study and office visit. We are also giving you a prescription for 20- 30 mmHg compression stockings. Try to wear these daily.

## 2024-12-02 ENCOUNTER — TELEPHONE (OUTPATIENT)
Dept: SURGERY | Age: 39
End: 2024-12-02

## 2024-12-02 DIAGNOSIS — K40.90 NON-RECURRENT INGUINAL HERNIA OF RIGHT SIDE WITHOUT OBSTRUCTION OR GANGRENE: ICD-10-CM

## 2024-12-02 NOTE — TELEPHONE ENCOUNTER
Pt called in ready to schedule sx. Pt seen JNG before, wondering if she needs to come in office or if she can schedule now

## 2024-12-03 NOTE — PROGRESS NOTES
[facility] PRE-OPERATIVE INSTRUCTIONS    Day of Procedure:12/17/2024               Arrival time:     0755             Surgery time: 0925    Take the following medications with a sip of water:  Follow your MD/Surgeons pre-procedure instructions regarding your medications     Do not eat or drink anything after 12:00 midnight prior to your surgery.  This includes water, chewing gum, mints and ice chips.   You may brush your teeth and gargle the morning of your surgery, but do not swallow the water.     Please see your family doctor/pediatrician for a history and physical and/or concerning medications.   Bring any test results/reports from your physicians office.   If you are under the care of a heart doctor or specialist doctor, please be aware that you may be asked to see them for clearance.    You may be asked to stop blood thinners such as Coumadin, Plavix, Fragmin, Lovenox, etc., or any anti-inflammatories such as:  Aspirin, Ibuprofen, Advil, Naproxen prior to your surgery.    We also ask that you stop any over the counter medications such as fish oil, vitamin E, glucosamine, garlic, Multivitamins, COQ 10, etc.    We ask that you do not smoke 24 hours prior to surgery.  We ask that you do not  drink any alcoholic beverages 24 hours prior to surgery     You must make arrangements for a responsible adult to take you home after your surgery.    For your safety, you will not be allowed to leave alone or drive yourself home.  Your surgery will be cancelled if you do not have a ride home.     Also for your safety, you must have someone stay with you the first 24 hours after your surgery.     A parent or legal guardian must accompany a child scheduled for surgery and plan to stay at the hospital until the child is discharged.    Please do not bring other children with you.    For your comfort, please wear simple loose fitting clothing to the hospital.  Please do not bring valuables.    Do not wear any make-up

## 2024-12-11 ENCOUNTER — HOSPITAL ENCOUNTER (OUTPATIENT)
Dept: WOMENS IMAGING | Age: 39
Discharge: HOME | End: 2024-12-11
Payer: COMMERCIAL

## 2024-12-11 VITALS — BODY MASS INDEX: 22.08 KG/M2 | WEIGHT: 120 LBS | HEIGHT: 62 IN

## 2024-12-11 DIAGNOSIS — Z12.31 ENCOUNTER FOR SCREENING MAMMOGRAM FOR BREAST CANCER: ICD-10-CM

## 2024-12-11 PROCEDURE — 77063 BREAST TOMOSYNTHESIS BI: CPT

## 2024-12-16 ENCOUNTER — ANESTHESIA EVENT (OUTPATIENT)
Dept: OPERATING ROOM | Age: 39
End: 2024-12-16
Payer: COMMERCIAL

## 2024-12-17 ENCOUNTER — HOSPITAL ENCOUNTER (OUTPATIENT)
Age: 39
Setting detail: OUTPATIENT SURGERY
Discharge: HOME | End: 2024-12-17
Attending: SURGERY | Admitting: SURGERY
Payer: COMMERCIAL

## 2024-12-17 ENCOUNTER — ANESTHESIA (OUTPATIENT)
Dept: OPERATING ROOM | Age: 39
End: 2024-12-17
Payer: COMMERCIAL

## 2024-12-17 VITALS
DIASTOLIC BLOOD PRESSURE: 64 MMHG | OXYGEN SATURATION: 99 % | TEMPERATURE: 98.5 F | WEIGHT: 120 LBS | HEART RATE: 55 BPM | RESPIRATION RATE: 16 BRPM | HEIGHT: 62 IN | BODY MASS INDEX: 22.08 KG/M2 | SYSTOLIC BLOOD PRESSURE: 96 MMHG

## 2024-12-17 DIAGNOSIS — K40.90 NON-RECURRENT INGUINAL HERNIA OF RIGHT SIDE WITHOUT OBSTRUCTION OR GANGRENE: Primary | ICD-10-CM

## 2024-12-17 LAB — HCG UR QL: NEGATIVE

## 2024-12-17 PROCEDURE — 2500000003 HC RX 250 WO HCPCS

## 2024-12-17 PROCEDURE — 7100000001 HC PACU RECOVERY - ADDTL 15 MIN: Performed by: SURGERY

## 2024-12-17 PROCEDURE — 7100000010 HC PHASE II RECOVERY - FIRST 15 MIN: Performed by: SURGERY

## 2024-12-17 PROCEDURE — 3700000001 HC ADD 15 MINUTES (ANESTHESIA): Performed by: SURGERY

## 2024-12-17 PROCEDURE — 2580000003 HC RX 258: Performed by: SURGERY

## 2024-12-17 PROCEDURE — 6360000002 HC RX W HCPCS: Performed by: SURGERY

## 2024-12-17 PROCEDURE — 3700000000 HC ANESTHESIA ATTENDED CARE: Performed by: SURGERY

## 2024-12-17 PROCEDURE — 6370000000 HC RX 637 (ALT 250 FOR IP): Performed by: ANESTHESIOLOGY

## 2024-12-17 PROCEDURE — 6360000002 HC RX W HCPCS: Performed by: ANESTHESIOLOGY

## 2024-12-17 PROCEDURE — 3600000019 HC SURGERY ROBOT ADDTL 15MIN: Performed by: SURGERY

## 2024-12-17 PROCEDURE — 49650 LAP ING HERNIA REPAIR INIT: CPT | Performed by: SURGERY

## 2024-12-17 PROCEDURE — 6360000002 HC RX W HCPCS

## 2024-12-17 PROCEDURE — 7100000000 HC PACU RECOVERY - FIRST 15 MIN: Performed by: SURGERY

## 2024-12-17 PROCEDURE — 2580000003 HC RX 258: Performed by: ANESTHESIOLOGY

## 2024-12-17 PROCEDURE — 7100000011 HC PHASE II RECOVERY - ADDTL 15 MIN: Performed by: SURGERY

## 2024-12-17 PROCEDURE — 2709999900 HC NON-CHARGEABLE SUPPLY: Performed by: SURGERY

## 2024-12-17 PROCEDURE — S2900 ROBOTIC SURGICAL SYSTEM: HCPCS | Performed by: SURGERY

## 2024-12-17 PROCEDURE — 84703 CHORIONIC GONADOTROPIN ASSAY: CPT

## 2024-12-17 PROCEDURE — C1781 MESH (IMPLANTABLE): HCPCS | Performed by: SURGERY

## 2024-12-17 PROCEDURE — 3600000009 HC SURGERY ROBOT BASE: Performed by: SURGERY

## 2024-12-17 DEVICE — MESH CS RIGHT LARGE 10CM X 16CM: Type: IMPLANTABLE DEVICE | Site: INGUINAL | Status: FUNCTIONAL

## 2024-12-17 RX ORDER — DIPHENHYDRAMINE HYDROCHLORIDE 50 MG/ML
12.5 INJECTION INTRAMUSCULAR; INTRAVENOUS
Status: DISCONTINUED | OUTPATIENT
Start: 2024-12-17 | End: 2024-12-17 | Stop reason: HOSPADM

## 2024-12-17 RX ORDER — FENTANYL CITRATE 0.05 MG/ML
50 INJECTION, SOLUTION INTRAMUSCULAR; INTRAVENOUS EVERY 5 MIN PRN
Status: DISCONTINUED | OUTPATIENT
Start: 2024-12-17 | End: 2024-12-17 | Stop reason: HOSPADM

## 2024-12-17 RX ORDER — PROPOFOL 10 MG/ML
INJECTION, EMULSION INTRAVENOUS
Status: DISCONTINUED | OUTPATIENT
Start: 2024-12-17 | End: 2024-12-17 | Stop reason: SDUPTHER

## 2024-12-17 RX ORDER — GLYCOPYRROLATE 0.2 MG/ML
INJECTION INTRAMUSCULAR; INTRAVENOUS
Status: DISCONTINUED | OUTPATIENT
Start: 2024-12-17 | End: 2024-12-17 | Stop reason: SDUPTHER

## 2024-12-17 RX ORDER — SODIUM CHLORIDE 0.9 % (FLUSH) 0.9 %
5-40 SYRINGE (ML) INJECTION EVERY 12 HOURS SCHEDULED
Status: DISCONTINUED | OUTPATIENT
Start: 2024-12-17 | End: 2024-12-17 | Stop reason: HOSPADM

## 2024-12-17 RX ORDER — FENTANYL CITRATE 50 UG/ML
INJECTION, SOLUTION INTRAMUSCULAR; INTRAVENOUS
Status: DISCONTINUED | OUTPATIENT
Start: 2024-12-17 | End: 2024-12-17 | Stop reason: SDUPTHER

## 2024-12-17 RX ORDER — ROCURONIUM BROMIDE 10 MG/ML
INJECTION, SOLUTION INTRAVENOUS
Status: DISCONTINUED | OUTPATIENT
Start: 2024-12-17 | End: 2024-12-17 | Stop reason: SDUPTHER

## 2024-12-17 RX ORDER — DEXAMETHASONE SODIUM PHOSPHATE 4 MG/ML
INJECTION, SOLUTION INTRA-ARTICULAR; INTRALESIONAL; INTRAMUSCULAR; INTRAVENOUS; SOFT TISSUE
Status: DISCONTINUED | OUTPATIENT
Start: 2024-12-17 | End: 2024-12-17 | Stop reason: SDUPTHER

## 2024-12-17 RX ORDER — OXYCODONE HYDROCHLORIDE 5 MG/1
5 TABLET ORAL
Status: DISCONTINUED | OUTPATIENT
Start: 2024-12-17 | End: 2024-12-17 | Stop reason: HOSPADM

## 2024-12-17 RX ORDER — LORAZEPAM 2 MG/ML
0.5 INJECTION INTRAMUSCULAR
Status: DISCONTINUED | OUTPATIENT
Start: 2024-12-17 | End: 2024-12-17 | Stop reason: HOSPADM

## 2024-12-17 RX ORDER — ONDANSETRON 2 MG/ML
INJECTION INTRAMUSCULAR; INTRAVENOUS
Status: DISCONTINUED | OUTPATIENT
Start: 2024-12-17 | End: 2024-12-17 | Stop reason: SDUPTHER

## 2024-12-17 RX ORDER — OXYCODONE AND ACETAMINOPHEN 5; 325 MG/1; MG/1
1-2 TABLET ORAL EVERY 6 HOURS PRN
Qty: 28 TABLET | Refills: 0 | Status: SHIPPED | OUTPATIENT
Start: 2024-12-17 | End: 2024-12-24

## 2024-12-17 RX ORDER — APREPITANT 40 MG/1
40 CAPSULE ORAL ONCE
Status: COMPLETED | OUTPATIENT
Start: 2024-12-17 | End: 2024-12-17

## 2024-12-17 RX ORDER — DOCUSATE SODIUM 100 MG/1
100 CAPSULE, LIQUID FILLED ORAL 2 TIMES DAILY PRN
Qty: 60 CAPSULE | Refills: 0 | Status: SHIPPED | OUTPATIENT
Start: 2024-12-17

## 2024-12-17 RX ORDER — SODIUM CHLORIDE 9 MG/ML
INJECTION, SOLUTION INTRAVENOUS PRN
Status: DISCONTINUED | OUTPATIENT
Start: 2024-12-17 | End: 2024-12-17 | Stop reason: HOSPADM

## 2024-12-17 RX ORDER — ONDANSETRON 2 MG/ML
4 INJECTION INTRAMUSCULAR; INTRAVENOUS
Status: DISCONTINUED | OUTPATIENT
Start: 2024-12-17 | End: 2024-12-17 | Stop reason: HOSPADM

## 2024-12-17 RX ORDER — SODIUM CHLORIDE 0.9 % (FLUSH) 0.9 %
5-40 SYRINGE (ML) INJECTION PRN
Status: DISCONTINUED | OUTPATIENT
Start: 2024-12-17 | End: 2024-12-17 | Stop reason: HOSPADM

## 2024-12-17 RX ORDER — BUPIVACAINE HYDROCHLORIDE 5 MG/ML
INJECTION, SOLUTION EPIDURAL; INTRACAUDAL
Status: COMPLETED | OUTPATIENT
Start: 2024-12-17 | End: 2024-12-17

## 2024-12-17 RX ORDER — LIDOCAINE HYDROCHLORIDE 20 MG/ML
INJECTION, SOLUTION EPIDURAL; INFILTRATION; INTRACAUDAL; PERINEURAL
Status: DISCONTINUED | OUTPATIENT
Start: 2024-12-17 | End: 2024-12-17 | Stop reason: SDUPTHER

## 2024-12-17 RX ORDER — IBUPROFEN 600 MG/1
600 TABLET, FILM COATED ORAL 3 TIMES DAILY PRN
Qty: 90 TABLET | Refills: 0 | Status: SHIPPED | OUTPATIENT
Start: 2024-12-17

## 2024-12-17 RX ORDER — NALOXONE HYDROCHLORIDE 0.4 MG/ML
INJECTION, SOLUTION INTRAMUSCULAR; INTRAVENOUS; SUBCUTANEOUS PRN
Status: DISCONTINUED | OUTPATIENT
Start: 2024-12-17 | End: 2024-12-17 | Stop reason: HOSPADM

## 2024-12-17 RX ORDER — SCOLOPAMINE TRANSDERMAL SYSTEM 1 MG/1
1 PATCH, EXTENDED RELEASE TRANSDERMAL ONCE
Status: DISCONTINUED | OUTPATIENT
Start: 2024-12-17 | End: 2024-12-17 | Stop reason: HOSPADM

## 2024-12-17 RX ORDER — HALOPERIDOL 5 MG/ML
1 INJECTION INTRAMUSCULAR
Status: DISCONTINUED | OUTPATIENT
Start: 2024-12-17 | End: 2024-12-17 | Stop reason: HOSPADM

## 2024-12-17 RX ORDER — MEPERIDINE HYDROCHLORIDE 25 MG/ML
12.5 INJECTION INTRAMUSCULAR; INTRAVENOUS; SUBCUTANEOUS
Status: DISCONTINUED | OUTPATIENT
Start: 2024-12-17 | End: 2024-12-17 | Stop reason: HOSPADM

## 2024-12-17 RX ADMIN — APREPITANT 40 MG: 40 CAPSULE ORAL at 08:57

## 2024-12-17 RX ADMIN — ROCURONIUM BROMIDE 35 MG: 10 SOLUTION INTRAVENOUS at 09:28

## 2024-12-17 RX ADMIN — PROPOFOL 180 MG: 10 INJECTION, EMULSION INTRAVENOUS at 09:27

## 2024-12-17 RX ADMIN — SODIUM CHLORIDE: 9 INJECTION, SOLUTION INTRAVENOUS at 09:02

## 2024-12-17 RX ADMIN — HYDROMORPHONE HYDROCHLORIDE 0.25 MG: 1 INJECTION, SOLUTION INTRAMUSCULAR; INTRAVENOUS; SUBCUTANEOUS at 11:50

## 2024-12-17 RX ADMIN — LIDOCAINE HYDROCHLORIDE 80 MG: 20 INJECTION, SOLUTION EPIDURAL; INFILTRATION; INTRACAUDAL; PERINEURAL at 09:27

## 2024-12-17 RX ADMIN — DEXAMETHASONE SODIUM PHOSPHATE 8 MG: 4 INJECTION, SOLUTION INTRAMUSCULAR; INTRAVENOUS at 09:34

## 2024-12-17 RX ADMIN — GLYCOPYRROLATE 0.1 MG: 0.2 INJECTION, SOLUTION INTRAMUSCULAR; INTRAVENOUS at 10:01

## 2024-12-17 RX ADMIN — ROCURONIUM BROMIDE 5 MG: 10 SOLUTION INTRAVENOUS at 09:27

## 2024-12-17 RX ADMIN — FENTANYL CITRATE 50 MCG: 50 INJECTION INTRAMUSCULAR; INTRAVENOUS at 09:42

## 2024-12-17 RX ADMIN — ONDANSETRON 4 MG: 2 INJECTION INTRAMUSCULAR; INTRAVENOUS at 10:32

## 2024-12-17 RX ADMIN — SUGAMMADEX 200 MG: 100 INJECTION, SOLUTION INTRAVENOUS at 10:42

## 2024-12-17 RX ADMIN — ROCURONIUM BROMIDE 10 MG: 10 SOLUTION INTRAVENOUS at 10:12

## 2024-12-17 RX ADMIN — FENTANYL CITRATE 50 MCG: 50 INJECTION INTRAMUSCULAR; INTRAVENOUS at 09:27

## 2024-12-17 RX ADMIN — SODIUM CHLORIDE 2000 MG: 900 INJECTION INTRAVENOUS at 09:35

## 2024-12-17 ASSESSMENT — PAIN DESCRIPTION - ORIENTATION
ORIENTATION: MID
ORIENTATION: MID
ORIENTATION: LOWER;RIGHT
ORIENTATION: MID

## 2024-12-17 ASSESSMENT — PAIN - FUNCTIONAL ASSESSMENT
PAIN_FUNCTIONAL_ASSESSMENT: PREVENTS OR INTERFERES SOME ACTIVE ACTIVITIES AND ADLS
PAIN_FUNCTIONAL_ASSESSMENT: 0-10
PAIN_FUNCTIONAL_ASSESSMENT: PREVENTS OR INTERFERES SOME ACTIVE ACTIVITIES AND ADLS
PAIN_FUNCTIONAL_ASSESSMENT: 0-10
PAIN_FUNCTIONAL_ASSESSMENT: NONE - DENIES PAIN
PAIN_FUNCTIONAL_ASSESSMENT: PREVENTS OR INTERFERES SOME ACTIVE ACTIVITIES AND ADLS
PAIN_FUNCTIONAL_ASSESSMENT: PREVENTS OR INTERFERES SOME ACTIVE ACTIVITIES AND ADLS

## 2024-12-17 ASSESSMENT — PAIN DESCRIPTION - FREQUENCY
FREQUENCY: CONTINUOUS

## 2024-12-17 ASSESSMENT — ENCOUNTER SYMPTOMS: SHORTNESS OF BREATH: 0

## 2024-12-17 ASSESSMENT — PAIN DESCRIPTION - PAIN TYPE
TYPE: SURGICAL PAIN

## 2024-12-17 ASSESSMENT — PAIN SCALES - GENERAL
PAINLEVEL_OUTOF10: 2
PAINLEVEL_OUTOF10: 2
PAINLEVEL_OUTOF10: 5
PAINLEVEL_OUTOF10: 2

## 2024-12-17 ASSESSMENT — PAIN DESCRIPTION - LOCATION
LOCATION: ABDOMEN

## 2024-12-17 ASSESSMENT — LIFESTYLE VARIABLES: SMOKING_STATUS: 0

## 2024-12-17 ASSESSMENT — PAIN DESCRIPTION - DESCRIPTORS
DESCRIPTORS: SORE
DESCRIPTORS: DISCOMFORT
DESCRIPTORS: ACHING;SORE
DESCRIPTORS: SORE
DESCRIPTORS: ACHING;SORE

## 2024-12-17 ASSESSMENT — PAIN DESCRIPTION - ONSET
ONSET: ON-GOING
ONSET: GRADUAL
ONSET: ON-GOING

## 2024-12-17 NOTE — PROGRESS NOTES
Pain remains tolerable. VSS. Discharge instructions reviewed with pt and . Both express an understanding of instructions. Prescriptions delivered.

## 2024-12-17 NOTE — PROGRESS NOTES
Patient admitted to PACU from OR. Patient opens eyes to name, drowsy. Resp easy unlabored on room air O2 with SaO2 100%. Abdominal surgical glue dressing x 3 TL with edges well approximated. Patient denies C/O pain or nausea. IV patent to left AC. VSS.

## 2024-12-17 NOTE — ANESTHESIA PRE PROCEDURE
Department of Anesthesiology  Preprocedure Note       Name:  Priscilla Peace   Age:  39 y.o.  :  1985                                          MRN:  [id number]         Date:  2024      Surgeon: Surgeon(s):  Nicolas Flaherty MD    Procedure: Procedure(s):  ROBOTIC ASSISTED LAPAROSCOPIC RIGHT INGUINAL HERNIA REPAIR WITH MESH, POSSIBLE OPEN    Medications prior to admission:   Prior to Admission medications    Not on File       Current medications:    Current Facility-Administered Medications   Medication Dose Route Frequency Provider Last Rate Last Admin    sodium chloride flush 0.9 % injection 5-40 mL  5-40 mL IntraVENous 2 times per day Lloyd Park MD        sodium chloride flush 0.9 % injection 5-40 mL  5-40 mL IntraVENous PRN Lloyd Park MD        0.9 % sodium chloride infusion   IntraVENous PRN Lloyd Park MD        ceFAZolin (ANCEF) 2,000 mg in sodium chloride 0.9 % 50 mL IVPB (mini-bag)  2,000 mg IntraVENous On Call to OR Nicolas Flaherty MD        scopolamine (TRANSDERM-SCOP) transdermal patch 1 patch  1 patch TransDERmal Once Lloyd Park MD        aprepitant (EMEND) capsule 40 mg  40 mg Oral Once Lloyd Park MD           Allergies:  No Known Allergies    Problem List:    Patient Active Problem List   Diagnosis Code    Radial styloid tenosynovitis M65.4    History of  Z98.891    Varicose veins of bilateral lower extremities with pain I83.813    Symptomatic spider varicose vein I83.899    Non-recurrent inguinal hernia of right side without obstruction or gangrene K40.90       Past Medical History:        Diagnosis Date    PONV (postoperative nausea and vomiting)     Postpartum depression        Past Surgical History:        Procedure Laterality Date     SECTION  2014     SECTION N/A 10/22/2020    repeat  SECTION low trasverse uterine incision at 0940 performed by Michael Flaherty MD at [facility] L&D OR     SECTION  2017    WISDOM TOOTH EXTRACTION

## 2024-12-17 NOTE — DISCHARGE INSTRUCTIONS
Nicolas Flaherty MD     General and Vascular Surgery   Femi Goodwin MD     [address] [facility]   Frank Conroy MD     [address]  [place] [address]         Phone: [phone]           Fax: [phone]                                               POST-OPERATIVE INSTRUCTIONS FOR HERNIA REPAIR    Call the office to schedule your post-operative appointment with your surgeon for two (2) weeks.     You will have water occlusive glue closing your incision(s).    You may shower.  Wash incision(s) gently, and pat dry. Do not rub your incision(s).  Do not soak incision(s) in tub baths, hot tubs or pools    General guidelines for activity:  Avoid strenuous activity or lifting anything heavier than 15 pounds for 4-6 weeks.   It is OK to be up walking around; walking up and down stairs is also OK.   Do what is comfortable: stop and rest when you feel tired.     Drink plenty of fluids and stay on a bland diet for 2-3 days after surgery.     Do NOT drive for one week and while taking your narcotic pain medicine.     Watch for signs of infection:   Fever over 100.5°   Excessive warmth or bright redness around your incision(s)  Leakage of bloody or cloudy fluid from your incision(s)    You will have pain medicine ordered. Take as directed.    If you experience constipation  Increase your water intake, and activity; walking is best.  A stool softener or mild laxative may be necessary if you still have not had a bowel  movement ; call the office for further instructions.

## 2024-12-17 NOTE — PROGRESS NOTES
Patient awake and alert.Resp easy unlabored on room air O2 with SaO2 100%. Abdominal surgical dressings x 3 TL with edges well approximated. Pain level 2 of 10 and tolerable. Patient denies C/O nausea. Patient stable to transfer to ACU for phase II.

## 2024-12-17 NOTE — ANESTHESIA POSTPROCEDURE EVALUATION
Department of Anesthesiology  Postprocedure Note    Patient: Priscilla Peace  MRN: [id number]  YOB: 1985  Date of evaluation: 12/17/2024    Procedure Summary       Date: 12/17/24 Room / Location: [address]    Anesthesia Start: 0922 Anesthesia Stop: 1056    Procedure: ROBOTIC ASSISTED LAPAROSCOPIC RIGHT INGUINAL HERNIA REPAIR WITH MESH (Right: Pelvis) Diagnosis:       Non-recurrent inguinal hernia of right side without obstruction or gangrene      (Non-recurrent inguinal hernia of right side without obstruction or gangrene [K40.90])    Surgeons: Nicolas Flaherty MD Responsible Provider: Lloyd Park MD    Anesthesia Type: general ASA Status: 1            Anesthesia Type: No value filed.    Kentrell Phase I: Kentrell Score: 10    Kentrell Phase II: Kentrell Score: 10    Anesthesia Post Evaluation    Patient location during evaluation: PACU  Patient participation: complete - patient participated  Level of consciousness: awake and alert  Pain score: 2  Nausea & Vomiting: no nausea  Cardiovascular status: hemodynamically stable  Respiratory status: acceptable  Hydration status: stable  Pain management: adequate    No notable events documented.

## 2024-12-17 NOTE — OP NOTE
OPERATIVE NOTE      Patient: Priscilla Peace MRN: [id number]     YOB: 1985  Age: 39 y.o.  Sex: female        Primary Care Physician: Shadi Ramirez MD         DATE OF OPERATION: 12/17/2024     PREOPERATIVE DIAGNOSIS: right inguinal hernia.    POSTOPERATIVE DIAGNOSIS: same    PROCEDURE PERFORMED: Robotic assisted laparoscopic repair of right inguinal hernia with Bard 3D Max MID large mesh    SURGEON: Nicolas Flaherty MD    ANESTHESIA: General endotracheal    ASA CLASS: 1    DVT PROPHYLAXIS: bilateral SCDs    ANTIBIOTICS: ancef 2g IV preoperatively      INDICATIONS FOR PROCEDURE:  The patient is a 39 y.o. female who presented with a bulge in her  right groin and was diagnosed with a right inguinal hernia.  She is here now for repair.   Risks, benefits, and alternatives were reviewed with the patient, questions were answered and she  is agreeable to proceed.            DESCRIPTION OF OPERATION: The patient was brought to the operating room, placed on the OR table in a supine position.  General anesthesia was obtained.  A mccormick catheter was inserted.  The patient did receive preoperative antibiotics and was wearing bilateral SCDs.  The abdomen and bilateral groin were prepped and draped in the usual sterile fashion.  A supraumbilical incision was made and a Veress was inserted into the abdomen.  The abdomen was insufflated with carbon dioxide gas to a pressure of 15 mm Hg.  An 8 mm port was then inserted.  A camera was then introduced.  There was no injury seen with gaining access into the abdomen.  Two additional trocars were then placed under direct visualization in the left and right upper quadrant.  The robot was then docked.    We first viewed the pelvis.  There was an obvious right indirect hernia.  There was no hernia on the left side.    The preperitoneal space on the right side was developed.  An incision was made into the peritoneum using hot sandra.  I dissected laterally out to

## 2024-12-17 NOTE — PROGRESS NOTES
Pt tolerates PO and sitting up in recliner.  assisted pt to dress. Wheeled pt to 's car for discharge.

## 2024-12-17 NOTE — H&P
Update History & Physical    The patient's History and Physical from Dr. Park today, December 17, 2024 was reviewed with the patient and I examined the patient. There was no change. The surgical site was confirmed by the patient and me.  Right inguinal hernia marked.      Plan: The risks, benefits, expected outcome, and alternative to the recommended procedure have been discussed with the patient. Patient understands and wants to proceed with the procedure.  Will proceed with robotic assisted laparoscopic right inguinal hernia repair with mesh, possible open.  Ancef ordered.  Bilateral SCDs.  Fine to be placed in the OR.    Electronically signed by Nicolas Flaherty MD on 12/17/2024 at 9:08 AM

## 2024-12-17 NOTE — PROGRESS NOTES
Pt awake on arrival to phase II. VSS. Discomfort 1/10 in right lower abdomen/ groin site and tolerable per pt. Pt states \"I just want to sleep.\" Given juice and call light.  to room. Called [facility] in retail pharmacy for prescriptions.

## 2025-01-13 ENCOUNTER — OFFICE VISIT (OUTPATIENT)
Dept: SURGERY | Age: 40
End: 2025-01-13

## 2025-01-13 VITALS — DIASTOLIC BLOOD PRESSURE: 73 MMHG | HEART RATE: 72 BPM | SYSTOLIC BLOOD PRESSURE: 112 MMHG

## 2025-01-13 DIAGNOSIS — Z98.890 S/P ROBOT-ASSISTED SURGICAL PROCEDURE: ICD-10-CM

## 2025-01-13 DIAGNOSIS — K40.90 NON-RECURRENT INGUINAL HERNIA OF RIGHT SIDE WITHOUT OBSTRUCTION OR GANGRENE: Primary | ICD-10-CM

## 2025-01-13 PROCEDURE — 99024 POSTOP FOLLOW-UP VISIT: CPT | Performed by: SURGERY

## 2025-01-13 NOTE — PROGRESS NOTES
Post Operative Visit    [facility]   Nicolas Flaherty MD    [address], [address]  [place], [place] [address]  Phone: [phone]  Fax: [phone]    Priscilla Peace   YOB: 1985    Date of Visit:  1/13/2025    No ref. provider found  Shadi Ramirez MD    Subjective:     Priscilla Peace presents for a four week follow-up s/p RAL right inguinal hernia repair with mesh. Overall she has been doing well since her operation.   There has been almost complete resolution of her pain.  She has been more sore after increasing ambulation.  She denies any fevers or chills.         No Known Allergies  No outpatient medications have been marked as taking for the 1/13/25 encounter (Office Visit) with Nicolas Flaherty MD.       Vitals:    01/13/25 1030   BP: 112/73   Pulse: 72     There is no height or weight on file to calculate BMI.     Wt Readings from Last 3 Encounters:   12/17/24 54.4 kg (120 lb)   12/11/24 54.4 kg (120 lb)   09/30/24 55.9 kg (123 lb 3.2 oz)     BP Readings from Last 3 Encounters:   01/13/25 112/73   12/17/24 96/64   09/30/24 94/60          Objective:    CONSTITUTIONAL:  awake, alert, no apparent distress    LUNGS:  Resp easy and unlabored  ABDOMEN:  incisions c/d/I, no erythema or induration, soft, non-distended, non-tender, voluntary guarding absent,  and hernia absent  MUSCULOSKELETAL: No edema  NEUROLOGIC:  Mental Status Exam:  Level of Alertness:   awake  Orientation:   person, place, time  SKIN: as above      Pathology:  N/A    ASSESSMENT:     Diagnosis Orders   1. Non-recurrent inguinal hernia of right side without obstruction or gangrene        2. S/P robot-assisted surgical procedure            PLAN:    Priscilla Peace is doing well s/p RAL right inguinal hernai repair with mesh.  Incisions are healing appropriately.  Will plan on having her follow up prn.     Maria R ORDAZ was present during visit

## (undated) DEVICE — SPONGE LAP W18XL18IN WHT COT 4 PLY FLD STRUNG RADPQ DISP ST

## (undated) DEVICE — GLOVE,SURG,SENSICARE SLT,LF,PF,6: Brand: MEDLINE

## (undated) DEVICE — 3M™ IOBAN™ 2 ANTIMICROBIAL INCISE DRAPE 6650EZ: Brand: IOBAN™ 2

## (undated) DEVICE — CATHETER TRAY 16 FR 5 CC FOL ANTIREFLX SAMPLING PRT DOVER

## (undated) DEVICE — SOLUTION IV IRRIG POUR BRL 0.9% SODIUM CHL 2F7124

## (undated) DEVICE — AGENT HEMSTAT 3GM OXIDIZED REGENERATED CELOS ABSRB FOR CONT (ORDER MULTIPLES OF 5EA)

## (undated) DEVICE — SUTURE MCRYL SZ 0 L36IN ABSRB VLT L48MM CTX 1/2 CIR Y398H

## (undated) DEVICE — BAG,SPONGE COUNTER,BLUE,50/BX,5BX/CS: Brand: MEDLINE

## (undated) DEVICE — SUTURE VCRL SZ 3-0 L36IN ABSRB UD L36MM CT-1 1/2 CIR J944H

## (undated) DEVICE — ARM DRAPE

## (undated) DEVICE — COVER LT HNDL BLU PLAS

## (undated) DEVICE — POOLE SUCTION INSTRUMENT,RIGID: Brand: ARGYLE

## (undated) DEVICE — SYRINGE MED 30ML STD CLR PLAS LUERLOCK TIP N CTRL DISP

## (undated) DEVICE — LARGE, DISPOSABLE ALEXIS O C-SECTION PROTECTOR - RETRACTOR: Brand: ALEXIS ® O C-SECTION PROTECTOR - RETRACTOR

## (undated) DEVICE — MERCY HEALTH WEST TURNOVER: Brand: MEDLINE INDUSTRIES, INC.

## (undated) DEVICE — TRANSFER SET 3": Brand: MEDLINE INDUSTRIES, INC.

## (undated) DEVICE — NEEDLE HYPO 23GA L1.5IN TURQ POLYPR HUB S STL THN WALL IM

## (undated) DEVICE — GLOVE SURG SZ 8 L12IN FNGR THK79MIL GRN LTX FREE

## (undated) DEVICE — SUTURE VCRL SZ 4-0 L18IN ABSRB UD L19MM PS-2 3/8 CIR PRIM J496H

## (undated) DEVICE — SUTURE VCRL + SZ 0 L18IN ABSRB UD L36MM CT-1 1/2 CIR VCP840D

## (undated) DEVICE — ROBOTIC GENERAL: Brand: MEDLINE INDUSTRIES, INC.

## (undated) DEVICE — 9165 UNIVERSAL PATIENT PLATE: Brand: 3M™

## (undated) DEVICE — TIP COVER ACCESSORY

## (undated) DEVICE — LIQUIBAND RAPID ADHESIVE 36/CS 0.8ML: Brand: MEDLINE

## (undated) DEVICE — SAFESECURE,SECUREMENT,FOLEY CATH,STERILE: Brand: MEDLINE

## (undated) DEVICE — SEAL

## (undated) DEVICE — Device: Brand: PORTEX

## (undated) DEVICE — Device

## (undated) DEVICE — CHLORAPREP 26ML ORANGE

## (undated) DEVICE — 30978 SEE SHARP - ENHANCED INTRAOPERATIVE LAPAROSCOPE CLEANING & DEFOGGING: Brand: 30978 SEE SHARP - ENHANCED INTRAOPERATIVE LAPAROSCOPE CLEANING & DEFOGGING

## (undated) DEVICE — BLANKET WRM W40.2XL55.9IN IORT LO BODY + MISTRAL AIR

## (undated) DEVICE — SUTURE ABSORBABLE MONOFILAMENT 2-0 8 IN ANTIBACT STRATAFIX SXMP1B409

## (undated) DEVICE — 1LYRTR 16FR10ML100%SIL UMS SNP: Brand: MEDLINE INDUSTRIES, INC.

## (undated) DEVICE — CANISTER, RIGID, 3000CC: Brand: MEDLINE INDUSTRIES, INC.

## (undated) DEVICE — STERILE LATEX POWDER-FREE SURGICAL GLOVESWITH NITRILE AND EMOLLIENT COATINGS: Brand: PROTEXIS

## (undated) DEVICE — SUTURE ABSORBABLE L 18 IN SZ 4-0 NDL L 19 MM POLYGLACTIN 910 36/BX

## (undated) DEVICE — INSUFFLATION NEEDLE TO ESTABLISH PNEUMOPERITONEUM.: Brand: INSUFFLATION NEEDLE

## (undated) DEVICE — DRAPE,REIN 53X77,STERILE: Brand: MEDLINE

## (undated) DEVICE — GLOVE ORANGE PI 7 1/2   MSG9075

## (undated) DEVICE — ELECTRODE PT RET AD L9FT HI MOIST COND ADH HYDRGEL CORDED

## (undated) DEVICE — BLADELESS OBTURATOR: Brand: WECK VISTA

## (undated) DEVICE — GARMENT,MEDLINE,DVT,INT,CALF,MED, GEN2: Brand: MEDLINE

## (undated) DEVICE — GOWN,AURORA,NONREINF,RAGLAN,XXL,STERILE: Brand: MEDLINE